# Patient Record
Sex: FEMALE | Race: WHITE | NOT HISPANIC OR LATINO
[De-identification: names, ages, dates, MRNs, and addresses within clinical notes are randomized per-mention and may not be internally consistent; named-entity substitution may affect disease eponyms.]

---

## 2017-02-17 ENCOUNTER — LAB SERVICES (OUTPATIENT)
Dept: OTHER | Age: 22
End: 2017-02-17

## 2017-02-17 LAB
T4 FREE SERPL-MCNC: 1.01 NG/DL (ref 0.78–2.19)
TSH SERPL-ACNC: <0.02 M[IU]/L (ref 0.3–4.82)

## 2017-03-14 ENCOUNTER — MYAURORA ACCOUNT LINK (OUTPATIENT)
Dept: OTHER | Age: 22
End: 2017-03-14

## 2017-03-14 ENCOUNTER — CHARTING TRANS (OUTPATIENT)
Dept: OBGYN | Age: 22
End: 2017-03-14

## 2017-03-22 ENCOUNTER — LAB SERVICES (OUTPATIENT)
Dept: OTHER | Age: 22
End: 2017-03-22

## 2017-03-22 ENCOUNTER — MYAURORA ACCOUNT LINK (OUTPATIENT)
Dept: OTHER | Age: 22
End: 2017-03-22

## 2017-03-22 ENCOUNTER — CHARTING TRANS (OUTPATIENT)
Dept: URGENT CARE | Age: 22
End: 2017-03-22

## 2017-03-22 LAB — DEPRECATED S PYO AG THROAT QL EIA: NEGATIVE

## 2017-03-22 ASSESSMENT — PAIN SCALES - GENERAL: PAINLEVEL_OUTOF10: 7

## 2017-05-05 ENCOUNTER — LAB SERVICES (OUTPATIENT)
Dept: OTHER | Age: 22
End: 2017-05-05

## 2017-05-05 LAB
T4 FREE SERPL-MCNC: 0.72 NG/DL (ref 0.78–2.19)
TSH SERPL-ACNC: <0.02 M[IU]/L (ref 0.3–4.82)

## 2017-05-07 LAB — T3 SERPL-MCNC: 1.03 NG/ML (ref 0.86–1.92)

## 2017-05-11 ENCOUNTER — CHARTING TRANS (OUTPATIENT)
Dept: OTHER | Age: 22
End: 2017-05-11

## 2017-05-19 ENCOUNTER — CHARTING TRANS (OUTPATIENT)
Dept: URGENT CARE | Age: 22
End: 2017-05-19

## 2017-05-19 ENCOUNTER — MYAURORA ACCOUNT LINK (OUTPATIENT)
Dept: OTHER | Age: 22
End: 2017-05-19

## 2017-05-19 ASSESSMENT — PAIN SCALES - GENERAL: PAINLEVEL_OUTOF10: 9

## 2017-05-22 ENCOUNTER — CHARTING TRANS (OUTPATIENT)
Dept: OTHER | Age: 22
End: 2017-05-22

## 2017-05-23 ENCOUNTER — CHARTING TRANS (OUTPATIENT)
Dept: OTHER | Age: 22
End: 2017-05-23

## 2017-05-24 ENCOUNTER — CHARTING TRANS (OUTPATIENT)
Dept: OTHER | Age: 22
End: 2017-05-24

## 2017-05-26 ENCOUNTER — CHARTING TRANS (OUTPATIENT)
Dept: INTERNAL MEDICINE | Age: 22
End: 2017-05-26

## 2017-05-26 ENCOUNTER — MYAURORA ACCOUNT LINK (OUTPATIENT)
Dept: OTHER | Age: 22
End: 2017-05-26

## 2017-05-27 ENCOUNTER — LAB SERVICES (OUTPATIENT)
Dept: OTHER | Age: 22
End: 2017-05-27

## 2017-05-27 ENCOUNTER — CHARTING TRANS (OUTPATIENT)
Dept: OTHER | Age: 22
End: 2017-05-27

## 2017-05-27 LAB
DIFFERENTIAL TYPE: ABNORMAL
HEMATOCRIT: 38.1 % (ref 34–45)
HEMOGLOBIN: 12.9 G/DL (ref 11.2–15.7)
LYMPH PERCENT: 39.8 % (ref 20.5–51.1)
LYMPHOCYTE ABSOLUTE #: 2.4 10*3/UL (ref 1.2–3.4)
MEAN CORPUSCULAR HGB CONCENTRATION: 33.9 % (ref 32–36)
MEAN CORPUSCULAR HGB: 29.3 PG (ref 27–34)
MEAN CORPUSCULAR VOLUME: 86.6 FL (ref 79–95)
MEAN PLATELET VOLUME: 10.1 FL (ref 8.6–12.4)
MIXED %: 10.7 % (ref 4.3–12.9)
MIXED ABSOLUTE #: 0.7 10*3/UL (ref 0.2–0.9)
NEUTROPHIL ABSOLUTE #: 3 10*3/UL (ref 1.4–6.5)
NEUTROPHIL PERCENT: 49.5 % (ref 34–73.5)
PLATELET COUNT: 453 10*3/UL (ref 150–400)
RED BLOOD CELL COUNT: 4.4 10*6/UL (ref 3.7–5.2)
RED CELL DISTRIBUTION WIDTH: 15 % (ref 11.3–14.8)
T4 FREE SERPL-MCNC: 0.82 NG/DL (ref 0.78–2.19)
TSH SERPL-ACNC: 0.19 M[IU]/L (ref 0.3–4.82)
WHITE BLOOD CELL COUNT: 6.1 10*3/UL (ref 4–10)

## 2017-05-28 LAB — T3 SERPL-MCNC: 1.5 NG/ML (ref 0.86–1.92)

## 2017-05-31 ENCOUNTER — CHARTING TRANS (OUTPATIENT)
Dept: ENDOCRINOLOGY | Age: 22
End: 2017-05-31

## 2017-06-01 ENCOUNTER — CHARTING TRANS (OUTPATIENT)
Dept: OTHER | Age: 22
End: 2017-06-01

## 2017-06-08 ENCOUNTER — CHARTING TRANS (OUTPATIENT)
Dept: OTHER | Age: 22
End: 2017-06-08

## 2017-06-15 ENCOUNTER — LAB SERVICES (OUTPATIENT)
Dept: OTHER | Age: 22
End: 2017-06-15

## 2017-06-15 LAB
DIFFERENTIAL TYPE: NORMAL
HEMATOCRIT: 41.5 % (ref 34–45)
HEMOGLOBIN: 14.2 G/DL (ref 11.2–15.7)
LYMPH PERCENT: 35 % (ref 20.5–51.1)
LYMPHOCYTE ABSOLUTE #: 2.5 10*3/UL (ref 1.2–3.4)
MEAN CORPUSCULAR HGB CONCENTRATION: 34.2 % (ref 32–36)
MEAN CORPUSCULAR HGB: 29.6 PG (ref 27–34)
MEAN CORPUSCULAR VOLUME: 86.5 FL (ref 79–95)
MEAN PLATELET VOLUME: 10.8 FL (ref 8.6–12.4)
MIXED %: 10.2 % (ref 4.3–12.9)
MIXED ABSOLUTE #: 0.7 10*3/UL (ref 0.2–0.9)
NEUTROPHIL ABSOLUTE #: 3.9 10*3/UL (ref 1.4–6.5)
NEUTROPHIL PERCENT: 54.8 % (ref 34–73.5)
PLATELET COUNT: 228 10*3/UL (ref 150–400)
RED BLOOD CELL COUNT: 4.8 10*6/UL (ref 3.7–5.2)
RED CELL DISTRIBUTION WIDTH: 14.1 % (ref 11.3–14.8)
T4 FREE SERPL-MCNC: 0.99 NG/DL (ref 0.78–2.19)
TSH SERPL-ACNC: <0.02 M[IU]/L (ref 0.3–4.82)
WHITE BLOOD CELL COUNT: 7.1 10*3/UL (ref 4–10)

## 2017-06-16 LAB — T3 SERPL-MCNC: 1.39 NG/ML (ref 0.86–1.92)

## 2017-07-03 ENCOUNTER — CHARTING TRANS (OUTPATIENT)
Dept: OTHER | Age: 22
End: 2017-07-03

## 2017-07-14 ENCOUNTER — LAB SERVICES (OUTPATIENT)
Dept: OTHER | Age: 22
End: 2017-07-14

## 2017-07-14 LAB
T4 FREE SERPL-MCNC: 1.41 NG/DL (ref 0.78–2.19)
TSH SERPL-ACNC: <0.02 M[IU]/L (ref 0.3–4.82)

## 2017-07-16 LAB — T3 SERPL-MCNC: 1.2 NG/ML (ref 0.86–1.92)

## 2017-07-18 ENCOUNTER — CHARTING TRANS (OUTPATIENT)
Dept: OTHER | Age: 22
End: 2017-07-18

## 2017-07-27 ENCOUNTER — CHARTING TRANS (OUTPATIENT)
Dept: ENDOCRINOLOGY | Age: 22
End: 2017-07-27

## 2017-07-27 ENCOUNTER — MYAURORA ACCOUNT LINK (OUTPATIENT)
Dept: OTHER | Age: 22
End: 2017-07-27

## 2017-08-10 ENCOUNTER — CHARTING TRANS (OUTPATIENT)
Dept: OTHER | Age: 22
End: 2017-08-10

## 2017-08-14 ENCOUNTER — LAB SERVICES (OUTPATIENT)
Dept: OTHER | Age: 22
End: 2017-08-14

## 2017-08-14 LAB
HCG UR QL: NEGATIVE
T4 FREE SERPL-MCNC: 1.2 NG/DL (ref 0.78–2.19)
TSH SERPL-ACNC: <0.02 M[IU]/L (ref 0.3–4.82)

## 2017-08-15 LAB — T3 SERPL-MCNC: 1.26 NG/ML (ref 0.86–1.92)

## 2017-08-22 ENCOUNTER — CHARTING TRANS (OUTPATIENT)
Dept: OTHER | Age: 22
End: 2017-08-22

## 2017-10-12 ENCOUNTER — LAB SERVICES (OUTPATIENT)
Dept: OTHER | Age: 22
End: 2017-10-12

## 2017-10-12 LAB
T4 FREE SERPL-MCNC: 2.24 NG/DL (ref 0.78–2.19)
TSH SERPL-ACNC: <0.02 M[IU]/L (ref 0.3–4.82)

## 2017-10-17 ENCOUNTER — CHARTING TRANS (OUTPATIENT)
Dept: ENDOCRINOLOGY | Age: 22
End: 2017-10-17

## 2017-10-17 ENCOUNTER — MYAURORA ACCOUNT LINK (OUTPATIENT)
Dept: OTHER | Age: 22
End: 2017-10-17

## 2017-12-01 ENCOUNTER — LAB SERVICES (OUTPATIENT)
Dept: OTHER | Age: 22
End: 2017-12-01

## 2017-12-01 LAB
T4 FREE SERPL-MCNC: 0.51 NG/DL (ref 0.78–2.19)
TSH SERPL-ACNC: 0.59 M[IU]/L (ref 0.3–4.82)

## 2017-12-03 LAB — T3 SERPL-MCNC: 0.71 NG/ML (ref 0.86–1.92)

## 2017-12-05 ENCOUNTER — CHARTING TRANS (OUTPATIENT)
Dept: OTHER | Age: 22
End: 2017-12-05

## 2018-01-17 ENCOUNTER — LAB SERVICES (OUTPATIENT)
Dept: OTHER | Age: 23
End: 2018-01-17

## 2018-01-17 LAB
T4 FREE SERPL-MCNC: 0.99 NG/DL (ref 0.78–2.19)
TSH SERPL-ACNC: 2.29 M[IU]/L (ref 0.3–4.82)

## 2018-03-06 ENCOUNTER — LAB SERVICES (OUTPATIENT)
Dept: OTHER | Age: 23
End: 2018-03-06

## 2018-03-06 LAB
T4 FREE SERPL-MCNC: 0.98 NG/DL (ref 0.78–2.19)
TSH SERPL-ACNC: 2.06 M[IU]/L (ref 0.3–4.82)

## 2018-03-09 ENCOUNTER — CHARTING TRANS (OUTPATIENT)
Dept: OTHER | Age: 23
End: 2018-03-09

## 2018-03-12 ENCOUNTER — CHARTING TRANS (OUTPATIENT)
Dept: OTHER | Age: 23
End: 2018-03-12

## 2018-03-15 ENCOUNTER — CHARTING TRANS (OUTPATIENT)
Dept: OTHER | Age: 23
End: 2018-03-15

## 2018-05-31 ENCOUNTER — MYAURORA ACCOUNT LINK (OUTPATIENT)
Dept: OTHER | Age: 23
End: 2018-05-31

## 2018-05-31 ENCOUNTER — CHARTING TRANS (OUTPATIENT)
Dept: OTHER | Age: 23
End: 2018-05-31

## 2018-06-01 ENCOUNTER — CHARTING TRANS (OUTPATIENT)
Dept: OTHER | Age: 23
End: 2018-06-01

## 2018-06-11 ENCOUNTER — CHARTING TRANS (OUTPATIENT)
Dept: OTHER | Age: 23
End: 2018-06-11

## 2018-07-02 ENCOUNTER — CHARTING TRANS (OUTPATIENT)
Dept: OTHER | Age: 23
End: 2018-07-02

## 2018-07-11 ENCOUNTER — CHARTING TRANS (OUTPATIENT)
Dept: OTHER | Age: 23
End: 2018-07-11

## 2018-07-12 ENCOUNTER — LAB SERVICES (OUTPATIENT)
Dept: OTHER | Age: 23
End: 2018-07-12

## 2018-07-12 LAB
RUBV IGG SERPL QL IA: NORMAL [IU]/ML
TSH SERPL DL<=0.05 MIU/L-ACNC: 1.29 M[IU]/L (ref 0.3–4.82)

## 2018-07-13 ENCOUNTER — CHARTING TRANS (OUTPATIENT)
Dept: OTHER | Age: 23
End: 2018-07-13

## 2018-07-13 LAB — HBV SURFACE AB SERPL IA-ACNC: <3.1 MUNITS/ML

## 2018-07-16 LAB
MUV IGG SER IA-ACNC: 1.4 OD RATIO
VZV IGG SER IA-ACNC: NORMAL

## 2018-07-17 LAB — MEV IGG SER IA-ACNC: ABNORMAL

## 2018-07-27 ENCOUNTER — CHARTING TRANS (OUTPATIENT)
Dept: OTHER | Age: 23
End: 2018-07-27

## 2018-08-02 ENCOUNTER — CHARTING TRANS (OUTPATIENT)
Dept: OTHER | Age: 23
End: 2018-08-02

## 2018-08-07 ENCOUNTER — CHARTING TRANS (OUTPATIENT)
Dept: OTHER | Age: 23
End: 2018-08-07

## 2018-08-07 ENCOUNTER — OFFICE VISIT (OUTPATIENT)
Dept: OCCUPATIONAL MEDICINE | Age: 23
End: 2018-08-07
Attending: FAMILY MEDICINE

## 2018-08-23 ENCOUNTER — CHARTING TRANS (OUTPATIENT)
Dept: OTHER | Age: 23
End: 2018-08-23

## 2018-09-05 ENCOUNTER — CHARTING TRANS (OUTPATIENT)
Dept: OTHER | Age: 23
End: 2018-09-05

## 2018-11-23 ENCOUNTER — IMAGING SERVICES (OUTPATIENT)
Dept: OTHER | Age: 23
End: 2018-11-23

## 2018-11-28 VITALS
HEART RATE: 60 BPM | HEIGHT: 63 IN | DIASTOLIC BLOOD PRESSURE: 60 MMHG | BODY MASS INDEX: 23.92 KG/M2 | SYSTOLIC BLOOD PRESSURE: 114 MMHG | WEIGHT: 135 LBS

## 2018-11-28 VITALS
WEIGHT: 132 LBS | BODY MASS INDEX: 23.39 KG/M2 | SYSTOLIC BLOOD PRESSURE: 114 MMHG | HEIGHT: 63 IN | DIASTOLIC BLOOD PRESSURE: 72 MMHG

## 2018-11-28 VITALS
SYSTOLIC BLOOD PRESSURE: 120 MMHG | DIASTOLIC BLOOD PRESSURE: 80 MMHG | HEIGHT: 63 IN | WEIGHT: 129 LBS | BODY MASS INDEX: 22.86 KG/M2 | HEART RATE: 72 BPM

## 2018-11-28 VITALS
WEIGHT: 130 LBS | DIASTOLIC BLOOD PRESSURE: 70 MMHG | SYSTOLIC BLOOD PRESSURE: 106 MMHG | OXYGEN SATURATION: 99 % | HEART RATE: 60 BPM | TEMPERATURE: 98 F | BODY MASS INDEX: 23.04 KG/M2 | HEIGHT: 63 IN

## 2018-11-28 VITALS
SYSTOLIC BLOOD PRESSURE: 122 MMHG | TEMPERATURE: 97.5 F | RESPIRATION RATE: 16 BRPM | DIASTOLIC BLOOD PRESSURE: 75 MMHG | OXYGEN SATURATION: 100 % | HEART RATE: 74 BPM

## 2018-11-28 VITALS
DIASTOLIC BLOOD PRESSURE: 84 MMHG | SYSTOLIC BLOOD PRESSURE: 108 MMHG | RESPIRATION RATE: 20 BRPM | HEART RATE: 124 BPM | OXYGEN SATURATION: 97 % | TEMPERATURE: 101 F

## 2018-11-29 VITALS
DIASTOLIC BLOOD PRESSURE: 72 MMHG | SYSTOLIC BLOOD PRESSURE: 114 MMHG | HEIGHT: 63 IN | BODY MASS INDEX: 23.57 KG/M2 | HEART RATE: 74 BPM | WEIGHT: 133 LBS

## 2019-01-02 ENCOUNTER — TELEPHONE (OUTPATIENT)
Dept: ENDOCRINOLOGY | Age: 24
End: 2019-01-02

## 2019-01-02 DIAGNOSIS — E05.90 HYPERTHYROIDISM: Primary | ICD-10-CM

## 2019-01-08 ENCOUNTER — E-ADVICE (OUTPATIENT)
Dept: ENDOCRINOLOGY | Age: 24
End: 2019-01-08

## 2019-02-07 RX ORDER — LEVOTHYROXINE SODIUM 0.07 MG/1
TABLET ORAL
Qty: 14 TABLET | Refills: 0 | Status: SHIPPED | OUTPATIENT
Start: 2019-02-07 | End: 2019-02-19 | Stop reason: SDUPTHER

## 2019-02-15 ENCOUNTER — LAB SERVICES (OUTPATIENT)
Dept: LAB | Age: 24
End: 2019-02-15

## 2019-02-15 DIAGNOSIS — E01.8 IODINE HYPOTHYROIDISM: Primary | ICD-10-CM

## 2019-02-15 DIAGNOSIS — E01.8 IODINE HYPOTHYROIDISM: ICD-10-CM

## 2019-02-15 LAB — TSH SERPL DL<=0.05 MIU/L-ACNC: 0.61 M[IU]/L (ref 0.3–4.82)

## 2019-02-15 PROCEDURE — 84443 ASSAY THYROID STIM HORMONE: CPT | Performed by: INTERNAL MEDICINE

## 2019-02-15 PROCEDURE — 36415 COLL VENOUS BLD VENIPUNCTURE: CPT | Performed by: INTERNAL MEDICINE

## 2019-02-18 ENCOUNTER — TELEPHONE (OUTPATIENT)
Dept: ENDOCRINOLOGY | Age: 24
End: 2019-02-18

## 2019-02-19 RX ORDER — LEVOTHYROXINE SODIUM 0.07 MG/1
75 TABLET ORAL DAILY
Qty: 90 TABLET | Refills: 0 | Status: SHIPPED | OUTPATIENT
Start: 2019-02-19 | End: 2019-05-15 | Stop reason: SDUPTHER

## 2019-03-05 VITALS
RESPIRATION RATE: 18 BRPM | SYSTOLIC BLOOD PRESSURE: 100 MMHG | DIASTOLIC BLOOD PRESSURE: 72 MMHG | HEART RATE: 52 BPM | OXYGEN SATURATION: 100 % | BODY MASS INDEX: 22.86 KG/M2 | TEMPERATURE: 96.1 F | WEIGHT: 129 LBS | HEIGHT: 63 IN

## 2019-04-13 ENCOUNTER — WALK IN (OUTPATIENT)
Dept: URGENT CARE | Age: 24
End: 2019-04-13

## 2019-04-13 DIAGNOSIS — J02.9 SORE THROAT: Primary | ICD-10-CM

## 2019-04-13 DIAGNOSIS — J01.90 ACUTE SINUSITIS, RECURRENCE NOT SPECIFIED, UNSPECIFIED LOCATION: ICD-10-CM

## 2019-04-13 LAB
INTERNAL PROCEDURAL CONTROLS ACCEPTABLE: YES
S PYO AG THROAT QL IA.RAPID: NEGATIVE

## 2019-04-13 PROCEDURE — 87880 STREP A ASSAY W/OPTIC: CPT | Performed by: FAMILY MEDICINE

## 2019-04-13 PROCEDURE — 99214 OFFICE O/P EST MOD 30 MIN: CPT | Performed by: FAMILY MEDICINE

## 2019-04-13 RX ORDER — CEFUROXIME AXETIL 500 MG/1
500 TABLET ORAL 2 TIMES DAILY
Qty: 20 TABLET | Refills: 0 | Status: SHIPPED | OUTPATIENT
Start: 2019-04-13 | End: 2019-04-23

## 2019-04-13 RX ORDER — PREDNISONE 50 MG/1
50 TABLET ORAL DAILY
Qty: 3 TABLET | Refills: 0 | Status: SHIPPED | OUTPATIENT
Start: 2019-04-13 | End: 2019-04-16

## 2019-04-13 ASSESSMENT — PAIN SCALES - GENERAL: PAINLEVEL: 5-6

## 2019-05-15 RX ORDER — LEVOTHYROXINE SODIUM 0.07 MG/1
75 TABLET ORAL DAILY
Qty: 90 TABLET | Refills: 0 | Status: SHIPPED | OUTPATIENT
Start: 2019-05-15 | End: 2019-06-05 | Stop reason: SDUPTHER

## 2019-05-21 ENCOUNTER — APPOINTMENT (OUTPATIENT)
Dept: ENDOCRINOLOGY | Age: 24
End: 2019-05-21

## 2019-06-04 ENCOUNTER — APPOINTMENT (OUTPATIENT)
Dept: ENDOCRINOLOGY | Age: 24
End: 2019-06-04

## 2019-06-04 ENCOUNTER — LAB SERVICES (OUTPATIENT)
Dept: LAB | Age: 24
End: 2019-06-04

## 2019-06-04 DIAGNOSIS — E05.90 HYPERTHYROIDISM: Primary | ICD-10-CM

## 2019-06-04 DIAGNOSIS — E05.90 HYPERTHYROIDISM: ICD-10-CM

## 2019-06-04 LAB — TSH SERPL DL<=0.05 MIU/L-ACNC: 1.84 M[IU]/L (ref 0.3–4.82)

## 2019-06-04 PROCEDURE — 84443 ASSAY THYROID STIM HORMONE: CPT | Performed by: INTERNAL MEDICINE

## 2019-06-04 PROCEDURE — 36415 COLL VENOUS BLD VENIPUNCTURE: CPT | Performed by: INTERNAL MEDICINE

## 2019-06-05 ENCOUNTER — OFFICE VISIT (OUTPATIENT)
Dept: ENDOCRINOLOGY | Age: 24
End: 2019-06-05

## 2019-06-05 VITALS
WEIGHT: 128 LBS | SYSTOLIC BLOOD PRESSURE: 102 MMHG | BODY MASS INDEX: 22.68 KG/M2 | HEIGHT: 63 IN | DIASTOLIC BLOOD PRESSURE: 60 MMHG | HEART RATE: 56 BPM

## 2019-06-05 DIAGNOSIS — E01.8 IODINE HYPOTHYROIDISM: Primary | ICD-10-CM

## 2019-06-05 DIAGNOSIS — Z86.39 HISTORY OF GRAVES' DISEASE: ICD-10-CM

## 2019-06-05 PROBLEM — D61.811: Status: RESOLVED | Noted: 2019-06-05 | Resolved: 2019-06-05

## 2019-06-05 PROBLEM — D61.811: Status: ACTIVE | Noted: 2019-06-05

## 2019-06-05 PROCEDURE — 99213 OFFICE O/P EST LOW 20 MIN: CPT | Performed by: INTERNAL MEDICINE

## 2019-06-05 RX ORDER — LEVOTHYROXINE SODIUM 0.07 MG/1
75 TABLET ORAL DAILY
Qty: 90 TABLET | Refills: 2 | Status: SHIPPED | OUTPATIENT
Start: 2019-06-05 | End: 2020-02-19 | Stop reason: SDUPTHER

## 2019-06-07 ENCOUNTER — OFFICE VISIT (OUTPATIENT)
Dept: OBGYN | Age: 24
End: 2019-06-07

## 2019-06-07 VITALS — WEIGHT: 129.5 LBS | SYSTOLIC BLOOD PRESSURE: 122 MMHG | BODY MASS INDEX: 22.94 KG/M2 | DIASTOLIC BLOOD PRESSURE: 68 MMHG

## 2019-06-07 DIAGNOSIS — Z12.4 SCREENING FOR CERVICAL CANCER: Primary | ICD-10-CM

## 2019-06-07 DIAGNOSIS — Z11.3 SCREENING FOR STDS (SEXUALLY TRANSMITTED DISEASES): ICD-10-CM

## 2019-06-07 DIAGNOSIS — Z23 NEED FOR HPV VACCINATION: ICD-10-CM

## 2019-06-07 DIAGNOSIS — Z11.51 SCREENING FOR HUMAN PAPILLOMAVIRUS (HPV): ICD-10-CM

## 2019-06-07 DIAGNOSIS — Z01.419 ENCOUNTER FOR GYNECOLOGICAL EXAMINATION WITHOUT ABNORMAL FINDING: ICD-10-CM

## 2019-06-07 PROCEDURE — 87491 CHLMYD TRACH DNA AMP PROBE: CPT | Performed by: OBSTETRICS & GYNECOLOGY

## 2019-06-07 PROCEDURE — 99385 PREV VISIT NEW AGE 18-39: CPT | Performed by: OBSTETRICS & GYNECOLOGY

## 2019-06-07 PROCEDURE — 90471 IMMUNIZATION ADMIN: CPT | Performed by: OBSTETRICS & GYNECOLOGY

## 2019-06-07 PROCEDURE — 87591 N.GONORRHOEAE DNA AMP PROB: CPT | Performed by: OBSTETRICS & GYNECOLOGY

## 2019-06-07 PROCEDURE — 90651 9VHPV VACCINE 2/3 DOSE IM: CPT

## 2019-06-07 PROCEDURE — 88142 CYTOPATH C/V THIN LAYER: CPT | Performed by: PATHOLOGY

## 2019-06-08 LAB
C TRACH DNA SPEC QL NAA+PROBE: NEGATIVE
N GONORRHOEA DNA SPEC QL NAA+PROBE: NEGATIVE

## 2019-06-13 LAB — AP REPORT: NORMAL

## 2019-08-01 ENCOUNTER — LAB SERVICES (OUTPATIENT)
Dept: LAB | Age: 24
End: 2019-08-01

## 2019-08-01 ENCOUNTER — OFFICE VISIT (OUTPATIENT)
Dept: INTERNAL MEDICINE | Age: 24
End: 2019-08-01

## 2019-08-01 VITALS
HEART RATE: 81 BPM | SYSTOLIC BLOOD PRESSURE: 100 MMHG | BODY MASS INDEX: 22.5 KG/M2 | TEMPERATURE: 99 F | HEIGHT: 63 IN | DIASTOLIC BLOOD PRESSURE: 64 MMHG | OXYGEN SATURATION: 98 % | WEIGHT: 127 LBS

## 2019-08-01 DIAGNOSIS — Z00.00 PREVENTATIVE HEALTH CARE: Primary | ICD-10-CM

## 2019-08-01 DIAGNOSIS — Z00.00 PREVENTATIVE HEALTH CARE: ICD-10-CM

## 2019-08-01 DIAGNOSIS — Z71.89 BEREAVEMENT COUNSELING: ICD-10-CM

## 2019-08-01 DIAGNOSIS — Z11.1 SCREENING-PULMONARY TB: ICD-10-CM

## 2019-08-01 DIAGNOSIS — Z23 NEED FOR HPV VACCINATION: ICD-10-CM

## 2019-08-01 PROBLEM — R50.81 FEBRILE NEUTROPENIA (CMD): Status: ACTIVE | Noted: 2017-05-23

## 2019-08-01 PROBLEM — E87.1 HYPONATREMIA: Status: ACTIVE | Noted: 2017-05-20

## 2019-08-01 PROBLEM — R79.89 ABNORMAL LFTS (LIVER FUNCTION TESTS): Status: ACTIVE | Noted: 2017-05-20

## 2019-08-01 PROBLEM — D70.9 FEBRILE NEUTROPENIA (CMD): Status: ACTIVE | Noted: 2017-05-23

## 2019-08-01 PROBLEM — D69.6 THROMBOCYTOPENIA (CMD): Status: ACTIVE | Noted: 2017-05-19

## 2019-08-01 PROBLEM — E87.6 HYPOKALEMIA: Status: ACTIVE | Noted: 2017-05-20

## 2019-08-01 PROBLEM — E05.00 GRAVES DISEASE: Status: ACTIVE | Noted: 2017-05-20

## 2019-08-01 LAB
BASOPHIL %: 0.5 % (ref 0–1.2)
BASOPHIL ABSOLUTE #: 0 10*3/UL (ref 0–0.1)
DIFFERENTIAL TYPE: ABNORMAL
EOSINOPHIL %: 0.9 % (ref 0–10)
EOSINOPHIL ABSOLUTE #: 0.1 10*3/UL (ref 0–0.5)
HEMATOCRIT: 43.7 % (ref 34–45)
HEMOGLOBIN: 14.4 G/DL (ref 11.2–15.7)
LYMPH PERCENT: 18 % (ref 20.5–51.1)
LYMPHOCYTE ABSOLUTE #: 1.3 10*3/UL (ref 1.2–3.4)
MEAN CORPUSCULAR HGB CONCENTRATION: 33 % (ref 32–36)
MEAN CORPUSCULAR HGB: 30.1 PG (ref 27–34)
MEAN CORPUSCULAR VOLUME: 91.2 FL (ref 79–95)
MEAN PLATELET VOLUME: 12.4 FL (ref 8.6–12.4)
MONOCYTE ABSOLUTE #: 0.5 10*3/UL (ref 0.2–0.9)
MONOCYTE PERCENT: 6.6 % (ref 4.3–12.9)
NEUTROPHIL ABSOLUTE #: 5.5 10*3/UL (ref 1.4–6.5)
NEUTROPHIL PERCENT: 74 % (ref 34–73.5)
PLATELET COUNT: 259 10*3/UL (ref 150–400)
RED BLOOD CELL COUNT: 4.79 10*6/UL (ref 3.7–5.2)
RED CELL DISTRIBUTION WIDTH: 12.5 % (ref 11.3–14.8)
WHITE BLOOD CELL COUNT: 7.4 10*3/UL (ref 4–10)

## 2019-08-01 PROCEDURE — 85025 COMPLETE CBC W/AUTO DIFF WBC: CPT | Performed by: INTERNAL MEDICINE

## 2019-08-01 PROCEDURE — 90471 IMMUNIZATION ADMIN: CPT

## 2019-08-01 PROCEDURE — 99385 PREV VISIT NEW AGE 18-39: CPT | Performed by: INTERNAL MEDICINE

## 2019-08-01 PROCEDURE — 90651 9VHPV VACCINE 2/3 DOSE IM: CPT

## 2019-08-01 PROCEDURE — 36415 COLL VENOUS BLD VENIPUNCTURE: CPT | Performed by: INTERNAL MEDICINE

## 2019-08-01 PROCEDURE — 86480 TB TEST CELL IMMUN MEASURE: CPT | Performed by: INTERNAL MEDICINE

## 2019-08-01 SDOH — HEALTH STABILITY: PHYSICAL HEALTH: ON AVERAGE, HOW MANY DAYS PER WEEK DO YOU ENGAGE IN MODERATE TO STRENUOUS EXERCISE (LIKE A BRISK WALK)?: 2 DAYS

## 2019-08-01 SDOH — HEALTH STABILITY: PHYSICAL HEALTH: ON AVERAGE, HOW MANY MINUTES DO YOU ENGAGE IN EXERCISE AT THIS LEVEL?: 40 MIN

## 2019-08-03 LAB
GAMMA INTERFERON BACKGROUND BLD IA-ACNC: 0.02 IU/ML
M TB IFN-G BLD-IMP: NEGATIVE
M TB IFN-G CD4+ BCKGRND COR BLD-ACNC: 0 IU/ML
M TB IFN-G CD4+CD8+ BCKGRND COR BLD-ACNC: 0 IU/ML
MITOGEN IGNF BCKGRD COR BLD-ACNC: 8.37 IU/ML

## 2019-08-09 ENCOUNTER — APPOINTMENT (OUTPATIENT)
Dept: OBGYN | Age: 24
End: 2019-08-09

## 2019-12-04 ENCOUNTER — WALK IN (OUTPATIENT)
Dept: URGENT CARE | Age: 24
End: 2019-12-04

## 2019-12-04 VITALS
DIASTOLIC BLOOD PRESSURE: 68 MMHG | SYSTOLIC BLOOD PRESSURE: 126 MMHG | TEMPERATURE: 96.4 F | RESPIRATION RATE: 16 BRPM | HEART RATE: 69 BPM | OXYGEN SATURATION: 100 %

## 2019-12-04 DIAGNOSIS — J02.9 SORE THROAT: Primary | ICD-10-CM

## 2019-12-04 DIAGNOSIS — J02.9 PHARYNGITIS, UNSPECIFIED ETIOLOGY: ICD-10-CM

## 2019-12-04 LAB
INTERNAL PROCEDURAL CONTROLS ACCEPTABLE: YES
S PYO AG THROAT QL IA.RAPID: NEGATIVE

## 2019-12-04 PROCEDURE — 99214 OFFICE O/P EST MOD 30 MIN: CPT | Performed by: FAMILY MEDICINE

## 2019-12-04 PROCEDURE — 87880 STREP A ASSAY W/OPTIC: CPT | Performed by: FAMILY MEDICINE

## 2019-12-04 RX ORDER — PREDNISONE 20 MG/1
40 TABLET ORAL DAILY
Qty: 14 TABLET | Refills: 0 | Status: SHIPPED | OUTPATIENT
Start: 2019-12-04 | End: 2019-12-11

## 2020-02-19 DIAGNOSIS — E01.8 IODINE HYPOTHYROIDISM: ICD-10-CM

## 2020-02-19 RX ORDER — LEVOTHYROXINE SODIUM 0.07 MG/1
TABLET ORAL
Qty: 30 TABLET | Refills: 0 | Status: SHIPPED | OUTPATIENT
Start: 2020-02-19 | End: 2020-03-16 | Stop reason: SDUPTHER

## 2020-03-16 DIAGNOSIS — E01.8 IODINE HYPOTHYROIDISM: ICD-10-CM

## 2020-03-16 RX ORDER — LEVOTHYROXINE SODIUM 0.07 MG/1
75 TABLET ORAL DAILY
Qty: 30 TABLET | Refills: 0 | Status: SHIPPED | OUTPATIENT
Start: 2020-03-16 | End: 2020-03-18 | Stop reason: SDUPTHER

## 2020-03-17 ENCOUNTER — LAB SERVICES (OUTPATIENT)
Dept: LAB | Age: 25
End: 2020-03-17

## 2020-03-17 DIAGNOSIS — E01.8 IODINE HYPOTHYROIDISM: ICD-10-CM

## 2020-03-17 LAB — TSH SERPL DL<=0.05 MIU/L-ACNC: 0.59 M[IU]/L (ref 0.3–4.82)

## 2020-03-17 PROCEDURE — 84443 ASSAY THYROID STIM HORMONE: CPT | Performed by: INTERNAL MEDICINE

## 2020-03-17 PROCEDURE — 36415 COLL VENOUS BLD VENIPUNCTURE: CPT | Performed by: INTERNAL MEDICINE

## 2020-03-18 DIAGNOSIS — E01.8 IODINE HYPOTHYROIDISM: ICD-10-CM

## 2020-03-18 RX ORDER — LEVOTHYROXINE SODIUM 0.07 MG/1
75 TABLET ORAL DAILY
Qty: 30 TABLET | Refills: 2 | Status: SHIPPED | OUTPATIENT
Start: 2020-03-18 | End: 2020-04-01 | Stop reason: SDUPTHER

## 2020-03-20 ENCOUNTER — TELEPHONE (OUTPATIENT)
Dept: ENDOCRINOLOGY | Age: 25
End: 2020-03-20

## 2020-03-25 ENCOUNTER — OFFICE VISIT (OUTPATIENT)
Dept: ENDOCRINOLOGY | Age: 25
End: 2020-03-25

## 2020-03-25 ENCOUNTER — TELEPHONE (OUTPATIENT)
Dept: ENDOCRINOLOGY | Age: 25
End: 2020-03-25

## 2020-03-25 DIAGNOSIS — E89.0 POSTABLATIVE HYPOTHYROIDISM: Primary | ICD-10-CM

## 2020-03-25 PROCEDURE — X1094 NO CHARGE VISIT: HCPCS | Performed by: NURSE PRACTITIONER

## 2020-03-26 ENCOUNTER — TELEPHONE (OUTPATIENT)
Dept: ENDOCRINOLOGY | Age: 25
End: 2020-03-26

## 2020-04-01 ENCOUNTER — OFFICE VISIT (OUTPATIENT)
Dept: ENDOCRINOLOGY | Age: 25
End: 2020-04-01

## 2020-04-01 DIAGNOSIS — E01.8 IODINE HYPOTHYROIDISM: ICD-10-CM

## 2020-04-01 PROCEDURE — 99213 OFFICE O/P EST LOW 20 MIN: CPT | Performed by: NURSE PRACTITIONER

## 2020-04-01 RX ORDER — LEVOTHYROXINE SODIUM 0.07 MG/1
75 TABLET ORAL DAILY
Qty: 90 TABLET | Refills: 3 | Status: SHIPPED | OUTPATIENT
Start: 2020-04-01 | End: 2020-09-24 | Stop reason: SDUPTHER

## 2020-09-24 DIAGNOSIS — E01.8 IODINE HYPOTHYROIDISM: ICD-10-CM

## 2020-09-24 RX ORDER — LEVOTHYROXINE SODIUM 0.07 MG/1
75 TABLET ORAL DAILY
Qty: 90 TABLET | Refills: 0 | Status: SHIPPED | OUTPATIENT
Start: 2020-09-24 | End: 2020-11-30

## 2020-10-19 ENCOUNTER — APPOINTMENT (OUTPATIENT)
Dept: OBGYN | Age: 25
End: 2020-10-19

## 2020-11-03 ENCOUNTER — APPOINTMENT (OUTPATIENT)
Dept: OBGYN | Age: 25
End: 2020-11-03

## 2020-11-10 ENCOUNTER — OFFICE VISIT (OUTPATIENT)
Dept: OBGYN | Age: 25
End: 2020-11-10

## 2020-11-10 VITALS
TEMPERATURE: 97.3 F | WEIGHT: 133 LBS | HEIGHT: 63 IN | RESPIRATION RATE: 17 BRPM | BODY MASS INDEX: 23.57 KG/M2 | DIASTOLIC BLOOD PRESSURE: 70 MMHG | HEART RATE: 68 BPM | SYSTOLIC BLOOD PRESSURE: 110 MMHG

## 2020-11-10 DIAGNOSIS — Z11.8 ENCOUNTER FOR SCREENING EXAMINATION FOR CHLAMYDIAL INFECTION: ICD-10-CM

## 2020-11-10 DIAGNOSIS — Z11.3 ENCOUNTER FOR SCREENING EXAMINATION FOR SEXUALLY TRANSMITTED DISEASE: ICD-10-CM

## 2020-11-10 DIAGNOSIS — Z01.419 ENCOUNTER FOR GYNECOLOGICAL EXAMINATION WITHOUT ABNORMAL FINDING: Primary | ICD-10-CM

## 2020-11-10 PROCEDURE — 87591 N.GONORRHOEAE DNA AMP PROB: CPT | Performed by: OBSTETRICS & GYNECOLOGY

## 2020-11-10 PROCEDURE — 99395 PREV VISIT EST AGE 18-39: CPT | Performed by: OBSTETRICS & GYNECOLOGY

## 2020-11-10 PROCEDURE — 87491 CHLMYD TRACH DNA AMP PROBE: CPT | Performed by: OBSTETRICS & GYNECOLOGY

## 2020-11-10 RX ORDER — MISOPROSTOL 200 UG/1
TABLET ORAL
Qty: 2 TABLET | Refills: 0 | Status: SHIPPED | OUTPATIENT
Start: 2020-11-10 | End: 2022-02-16 | Stop reason: ALTCHOICE

## 2020-11-12 ENCOUNTER — APPOINTMENT (OUTPATIENT)
Dept: OBGYN | Age: 25
End: 2020-11-12

## 2020-11-12 LAB
C TRACH RRNA SPEC QL NAA+PROBE: NEGATIVE
N GONORRHOEA RRNA SPEC QL NAA+PROBE: NEGATIVE
SPECIMEN SOURCE: NORMAL

## 2020-11-30 ENCOUNTER — LAB SERVICES (OUTPATIENT)
Dept: LAB | Age: 25
End: 2020-11-30

## 2020-11-30 DIAGNOSIS — E01.8 IODINE HYPOTHYROIDISM: ICD-10-CM

## 2020-11-30 DIAGNOSIS — Z01.419 ENCOUNTER FOR GYNECOLOGICAL EXAMINATION WITHOUT ABNORMAL FINDING: ICD-10-CM

## 2020-11-30 LAB — TSH SERPL DL<=0.05 MIU/L-ACNC: 3.74 M[IU]/L (ref 0.3–4.82)

## 2020-11-30 PROCEDURE — 36415 COLL VENOUS BLD VENIPUNCTURE: CPT | Performed by: OBSTETRICS & GYNECOLOGY

## 2020-11-30 PROCEDURE — 84443 ASSAY THYROID STIM HORMONE: CPT | Performed by: OBSTETRICS & GYNECOLOGY

## 2020-11-30 RX ORDER — LEVOTHYROXINE SODIUM 0.07 MG/1
TABLET ORAL
Qty: 90 TABLET | Refills: 0 | Status: SHIPPED | OUTPATIENT
Start: 2020-11-30 | End: 2021-02-17

## 2020-12-08 ENCOUNTER — OFFICE VISIT (OUTPATIENT)
Dept: OBGYN | Age: 25
End: 2020-12-08

## 2020-12-08 VITALS
SYSTOLIC BLOOD PRESSURE: 118 MMHG | DIASTOLIC BLOOD PRESSURE: 68 MMHG | WEIGHT: 137.13 LBS | HEART RATE: 70 BPM | BODY MASS INDEX: 24.3 KG/M2 | HEIGHT: 63 IN | TEMPERATURE: 96.9 F | RESPIRATION RATE: 18 BRPM

## 2020-12-08 DIAGNOSIS — Z30.09 FAMILY PLANNING: ICD-10-CM

## 2020-12-08 DIAGNOSIS — Z01.812 PRE-PROCEDURE LAB EXAM: Primary | ICD-10-CM

## 2020-12-08 DIAGNOSIS — Z30.430 ENCOUNTER FOR INSERTION OF INTRAUTERINE CONTRACEPTIVE DEVICE (IUD): ICD-10-CM

## 2020-12-08 LAB
B-HCG UR QL: NEGATIVE
INTERNAL PROCEDURAL CONTROLS ACCEPTABLE: YES

## 2020-12-08 PROCEDURE — 99214 OFFICE O/P EST MOD 30 MIN: CPT | Performed by: OBSTETRICS & GYNECOLOGY

## 2020-12-08 PROCEDURE — 58300 INSERT INTRAUTERINE DEVICE: CPT | Performed by: OBSTETRICS & GYNECOLOGY

## 2020-12-08 PROCEDURE — 81025 URINE PREGNANCY TEST: CPT | Performed by: OBSTETRICS & GYNECOLOGY

## 2021-02-08 ENCOUNTER — WALK IN (OUTPATIENT)
Dept: URGENT CARE | Age: 26
End: 2021-02-08

## 2021-02-08 ENCOUNTER — E-ADVICE (OUTPATIENT)
Dept: INTERNAL MEDICINE | Age: 26
End: 2021-02-08

## 2021-02-08 VITALS
OXYGEN SATURATION: 100 % | DIASTOLIC BLOOD PRESSURE: 70 MMHG | HEART RATE: 54 BPM | RESPIRATION RATE: 16 BRPM | SYSTOLIC BLOOD PRESSURE: 110 MMHG | TEMPERATURE: 96.1 F

## 2021-02-08 DIAGNOSIS — R30.0 DYSURIA: Primary | ICD-10-CM

## 2021-02-08 DIAGNOSIS — N30.01 ACUTE CYSTITIS WITH HEMATURIA: ICD-10-CM

## 2021-02-08 DIAGNOSIS — B37.31 CANDIDAL VAGINITIS: ICD-10-CM

## 2021-02-08 LAB
APPEARANCE, POC: ABNORMAL
BACTERIA: ABNORMAL
COLOR, POC: ABNORMAL
MUCOUS: ABNORMAL
RED BLOOD CELLS URINE: >100 (ref 0–3)
SQUAMOUS EPITHELIAL CELLS: ABNORMAL
WBC CLUMPS: ABNORMAL
WHITE BLOOD CELLS URINE: >100 (ref 0–5)
YEAST: ABNORMAL

## 2021-02-08 PROCEDURE — 87086 URINE CULTURE/COLONY COUNT: CPT | Performed by: EMERGENCY MEDICINE

## 2021-02-08 PROCEDURE — 87088 URINE BACTERIA CULTURE: CPT | Performed by: EMERGENCY MEDICINE

## 2021-02-08 PROCEDURE — 87186 SC STD MICRODIL/AGAR DIL: CPT | Performed by: EMERGENCY MEDICINE

## 2021-02-08 PROCEDURE — 81015 MICROSCOPIC EXAM OF URINE: CPT | Performed by: EMERGENCY MEDICINE

## 2021-02-08 PROCEDURE — 99213 OFFICE O/P EST LOW 20 MIN: CPT | Performed by: EMERGENCY MEDICINE

## 2021-02-08 PROCEDURE — 81002 URINALYSIS NONAUTO W/O SCOPE: CPT | Performed by: EMERGENCY MEDICINE

## 2021-02-08 RX ORDER — SULFAMETHOXAZOLE AND TRIMETHOPRIM 800; 160 MG/1; MG/1
1 TABLET ORAL 2 TIMES DAILY
Qty: 6 TABLET | Refills: 0 | Status: SHIPPED | OUTPATIENT
Start: 2021-02-08 | End: 2021-02-11

## 2021-02-08 RX ORDER — FLUCONAZOLE 150 MG/1
150 TABLET ORAL ONCE
Qty: 1 TABLET | Refills: 0 | Status: SHIPPED | OUTPATIENT
Start: 2021-02-08 | End: 2021-02-08

## 2021-02-10 LAB — FINAL REPORT: ABNORMAL

## 2021-02-10 RX ORDER — CIPROFLOXACIN 500 MG/1
500 TABLET, FILM COATED ORAL 2 TIMES DAILY
Qty: 10 TABLET | Refills: 0 | Status: SHIPPED | OUTPATIENT
Start: 2021-02-10 | End: 2021-02-15

## 2021-02-16 DIAGNOSIS — E01.8 IODINE HYPOTHYROIDISM: ICD-10-CM

## 2021-02-17 RX ORDER — LEVOTHYROXINE SODIUM 0.07 MG/1
TABLET ORAL
Qty: 90 TABLET | Refills: 0 | Status: SHIPPED | OUTPATIENT
Start: 2021-02-17 | End: 2021-05-03 | Stop reason: SDUPTHER

## 2021-04-21 ENCOUNTER — APPOINTMENT (OUTPATIENT)
Dept: ENDOCRINOLOGY | Age: 26
End: 2021-04-21

## 2021-05-03 ENCOUNTER — TELEPHONE (OUTPATIENT)
Dept: ENDOCRINOLOGY | Age: 26
End: 2021-05-03

## 2021-05-03 DIAGNOSIS — E01.8 IODINE HYPOTHYROIDISM: ICD-10-CM

## 2021-05-03 RX ORDER — LEVOTHYROXINE SODIUM 0.07 MG/1
75 TABLET ORAL DAILY
Qty: 30 TABLET | Refills: 0 | Status: SHIPPED | OUTPATIENT
Start: 2021-05-03 | End: 2021-05-20

## 2021-05-08 DIAGNOSIS — E01.8 IODINE HYPOTHYROIDISM: ICD-10-CM

## 2021-05-11 ENCOUNTER — LAB REQUISITION (OUTPATIENT)
Dept: LAB | Age: 26
End: 2021-05-11

## 2021-05-11 DIAGNOSIS — Z00.00 ENCOUNTER FOR GENERAL ADULT MEDICAL EXAMINATION WITHOUT ABNORMAL FINDINGS: ICD-10-CM

## 2021-05-11 PROCEDURE — PSEU9049 QUANTIFERON TB PLUS: Performed by: CLINICAL MEDICAL LABORATORY

## 2021-05-11 PROCEDURE — 86480 TB TEST CELL IMMUN MEASURE: CPT | Performed by: CLINICAL MEDICAL LABORATORY

## 2021-05-12 ENCOUNTER — APPOINTMENT (OUTPATIENT)
Dept: ENDOCRINOLOGY | Age: 26
End: 2021-05-12

## 2021-05-13 LAB
GAMMA INTERFERON BACKGROUND BLD IA-ACNC: 0.03 IU/ML
M TB IFN-G BLD-IMP: NEGATIVE
M TB IFN-G CD4+ BCKGRND COR BLD-ACNC: 0 IU/ML
M TB IFN-G CD4+CD8+ BCKGRND COR BLD-ACNC: 0 IU/ML
MITOGEN IGNF BCKGRD COR BLD-ACNC: 8.75 IU/ML

## 2021-05-19 ENCOUNTER — LAB SERVICES (OUTPATIENT)
Dept: LAB | Age: 26
End: 2021-05-19

## 2021-05-19 ENCOUNTER — OFFICE VISIT (OUTPATIENT)
Dept: ENDOCRINOLOGY | Age: 26
End: 2021-05-19

## 2021-05-19 VITALS
WEIGHT: 137 LBS | TEMPERATURE: 97.3 F | BODY MASS INDEX: 24.27 KG/M2 | HEART RATE: 50 BPM | OXYGEN SATURATION: 99 % | HEIGHT: 63 IN | DIASTOLIC BLOOD PRESSURE: 68 MMHG | SYSTOLIC BLOOD PRESSURE: 100 MMHG

## 2021-05-19 DIAGNOSIS — R53.83 OTHER FATIGUE: ICD-10-CM

## 2021-05-19 DIAGNOSIS — E89.0 POSTABLATIVE HYPOTHYROIDISM: ICD-10-CM

## 2021-05-19 DIAGNOSIS — E89.0 POSTABLATIVE HYPOTHYROIDISM: Primary | ICD-10-CM

## 2021-05-19 LAB — TSH SERPL DL<=0.05 MIU/L-ACNC: 1.2 M[IU]/L (ref 0.3–4.82)

## 2021-05-19 PROCEDURE — 36415 COLL VENOUS BLD VENIPUNCTURE: CPT | Performed by: NURSE PRACTITIONER

## 2021-05-19 PROCEDURE — 84443 ASSAY THYROID STIM HORMONE: CPT | Performed by: NURSE PRACTITIONER

## 2021-05-19 PROCEDURE — 99213 OFFICE O/P EST LOW 20 MIN: CPT | Performed by: NURSE PRACTITIONER

## 2021-05-20 DIAGNOSIS — E01.8 IODINE HYPOTHYROIDISM: ICD-10-CM

## 2021-05-20 DIAGNOSIS — Z11.3 ENCOUNTER FOR SCREENING FOR INFECTIONS WITH A PREDOMINANTLY SEXUAL MODE OF TRANSMISSION: ICD-10-CM

## 2021-05-20 DIAGNOSIS — Z11.51 ENCOUNTER FOR SCREENING FOR HUMAN PAPILLOMAVIRUS (HPV): ICD-10-CM

## 2021-05-20 DIAGNOSIS — N92.3 OVULATION BLEEDING: ICD-10-CM

## 2021-05-20 DIAGNOSIS — Z12.4 ENCOUNTER FOR SCREENING FOR MALIGNANT NEOPLASM OF CERVIX: ICD-10-CM

## 2021-05-20 DIAGNOSIS — Z23 ENCOUNTER FOR IMMUNIZATION: ICD-10-CM

## 2021-05-20 DIAGNOSIS — R87.610 ATYPICAL SQUAMOUS CELLS OF UNDETERMINED SIGNIFICANCE ON CYTOLOGIC SMEAR OF CERVIX (ASC-US): ICD-10-CM

## 2021-05-20 DIAGNOSIS — T83.83XS: ICD-10-CM

## 2021-05-20 DIAGNOSIS — Z01.419 ENCOUNTER FOR GYNECOLOGICAL EXAMINATION (GENERAL) (ROUTINE) WITHOUT ABNORMAL FINDINGS: ICD-10-CM

## 2021-05-20 RX ORDER — LEVOTHYROXINE SODIUM 0.07 MG/1
TABLET ORAL
Qty: 90 TABLET | Refills: 1 | Status: SHIPPED | OUTPATIENT
Start: 2021-05-20 | End: 2021-05-20 | Stop reason: SDUPTHER

## 2021-05-20 RX ORDER — LEVOTHYROXINE SODIUM 0.07 MG/1
75 TABLET ORAL DAILY
Qty: 90 TABLET | Refills: 1 | Status: SHIPPED | OUTPATIENT
Start: 2021-05-20 | End: 2021-06-02 | Stop reason: SDUPTHER

## 2021-05-25 VITALS
SYSTOLIC BLOOD PRESSURE: 104 MMHG | OXYGEN SATURATION: 100 % | RESPIRATION RATE: 16 BRPM | DIASTOLIC BLOOD PRESSURE: 68 MMHG | HEART RATE: 42 BPM | TEMPERATURE: 97.7 F

## 2021-06-02 DIAGNOSIS — E01.8 IODINE HYPOTHYROIDISM: ICD-10-CM

## 2021-06-02 RX ORDER — LEVOTHYROXINE SODIUM 0.07 MG/1
75 TABLET ORAL DAILY
Qty: 90 TABLET | Refills: 1 | Status: SHIPPED | OUTPATIENT
Start: 2021-06-02 | End: 2021-08-24 | Stop reason: SDUPTHER

## 2021-08-23 ENCOUNTER — EMPLOYEE HEALTH (OUTPATIENT)
Dept: OTHER | Age: 26
End: 2021-08-23

## 2021-08-23 DIAGNOSIS — E01.8 IODINE HYPOTHYROIDISM: ICD-10-CM

## 2021-08-23 DIAGNOSIS — Z20.822 SUSPECTED COVID-19 VIRUS INFECTION: Primary | ICD-10-CM

## 2021-08-23 RX ORDER — LEVOTHYROXINE SODIUM 0.07 MG/1
75 TABLET ORAL DAILY
Qty: 90 TABLET | Refills: 1 | OUTPATIENT
Start: 2021-08-23

## 2021-08-24 ENCOUNTER — EMPLOYEE HEALTH (OUTPATIENT)
Dept: OTHER | Age: 26
End: 2021-08-24

## 2021-08-24 ENCOUNTER — E-ADVICE (OUTPATIENT)
Dept: ENDOCRINOLOGY | Age: 26
End: 2021-08-24

## 2021-08-24 DIAGNOSIS — E01.8 IODINE HYPOTHYROIDISM: ICD-10-CM

## 2021-08-24 RX ORDER — LEVOTHYROXINE SODIUM 0.07 MG/1
75 TABLET ORAL DAILY
Qty: 90 TABLET | Refills: 1 | Status: SHIPPED | OUTPATIENT
Start: 2021-08-24 | End: 2022-09-26 | Stop reason: SDUPTHER

## 2022-02-15 ENCOUNTER — APPOINTMENT (OUTPATIENT)
Dept: OBGYN | Age: 27
End: 2022-02-15

## 2022-02-16 ENCOUNTER — OFFICE VISIT (OUTPATIENT)
Dept: OBGYN | Age: 27
End: 2022-02-16

## 2022-02-16 VITALS
BODY MASS INDEX: 22.77 KG/M2 | WEIGHT: 128.5 LBS | HEIGHT: 63 IN | RESPIRATION RATE: 12 BRPM | DIASTOLIC BLOOD PRESSURE: 66 MMHG | HEART RATE: 56 BPM | SYSTOLIC BLOOD PRESSURE: 100 MMHG

## 2022-02-16 DIAGNOSIS — N90.89 SKIN TAG OF VULVA: ICD-10-CM

## 2022-02-16 DIAGNOSIS — Z11.51 SCREENING FOR HPV (HUMAN PAPILLOMAVIRUS): ICD-10-CM

## 2022-02-16 DIAGNOSIS — Z23 NEED FOR HPV VACCINATION: ICD-10-CM

## 2022-02-16 DIAGNOSIS — Z11.3 SCREEN FOR STD (SEXUALLY TRANSMITTED DISEASE): ICD-10-CM

## 2022-02-16 DIAGNOSIS — Z12.4 SCREENING FOR CERVICAL CANCER: ICD-10-CM

## 2022-02-16 DIAGNOSIS — R87.610 ATYPICAL SQUAMOUS CELLS OF UNDETERMINED SIGNIFICANCE ON CYTOLOGIC SMEAR OF CERVIX (ASC-US): ICD-10-CM

## 2022-02-16 DIAGNOSIS — Z01.419 ENCOUNTER FOR GYNECOLOGICAL EXAMINATION WITHOUT ABNORMAL FINDING: Primary | ICD-10-CM

## 2022-02-16 DIAGNOSIS — R10.2 PELVIC PAIN IN FEMALE: ICD-10-CM

## 2022-02-16 PROCEDURE — 90471 IMMUNIZATION ADMIN: CPT | Performed by: OBSTETRICS & GYNECOLOGY

## 2022-02-16 PROCEDURE — 87624 HPV HI-RISK TYP POOLED RSLT: CPT | Performed by: CLINICAL MEDICAL LABORATORY

## 2022-02-16 PROCEDURE — 81514 NFCT DS BV&VAGINITIS DNA ALG: CPT | Performed by: OBSTETRICS & GYNECOLOGY

## 2022-02-16 PROCEDURE — 88142 CYTOPATH C/V THIN LAYER: CPT | Performed by: PATHOLOGY

## 2022-02-16 PROCEDURE — 90651 9VHPV VACCINE 2/3 DOSE IM: CPT | Performed by: OBSTETRICS & GYNECOLOGY

## 2022-02-16 PROCEDURE — 87624 HPV HI-RISK TYP POOLED RSLT: CPT | Performed by: OBSTETRICS & GYNECOLOGY

## 2022-02-16 PROCEDURE — 99395 PREV VISIT EST AGE 18-39: CPT | Performed by: OBSTETRICS & GYNECOLOGY

## 2022-02-16 PROCEDURE — 88141 CYTOPATH C/V INTERPRET: CPT | Performed by: PATHOLOGY

## 2022-02-16 PROCEDURE — PSEU9939 HPV, HIGH RISK: Performed by: CLINICAL MEDICAL LABORATORY

## 2022-02-16 PROCEDURE — 87801 DETECT AGNT MULT DNA AMPLI: CPT | Performed by: OBSTETRICS & GYNECOLOGY

## 2022-02-17 LAB
BV BACTERIA DNA VAG QL NAA+PROBE: NEGATIVE
C GLABRATA DNA VAG QL NAA+PROBE: NEGATIVE
C KRUSEI DNA VAG QL NAA+PROBE: NEGATIVE
C TRACH DNA GENITAL QL NAA+PROBE: NEGATIVE
CANDIDA DNA VAG QL NAA+PROBE: POSITIVE
N GONORRHOEA DNA GENITAL QL NAA+PROBE: NEGATIVE
T VAGINALIS DNA GENITAL QL NAA+PROBE: NEGATIVE
T VAGINALIS DNA GENITAL QL NAA+PROBE: NEGATIVE

## 2022-02-22 ENCOUNTER — LAB REQUISITION (OUTPATIENT)
Dept: LAB | Age: 27
End: 2022-02-22

## 2022-02-22 DIAGNOSIS — Z01.419 ENCOUNTER FOR GYNECOLOGICAL EXAMINATION (GENERAL) (ROUTINE) WITHOUT ABNORMAL FINDINGS: ICD-10-CM

## 2022-02-22 DIAGNOSIS — Z23 ENCOUNTER FOR IMMUNIZATION: ICD-10-CM

## 2022-02-22 DIAGNOSIS — Z11.3 ENCOUNTER FOR SCREENING FOR INFECTIONS WITH A PREDOMINANTLY SEXUAL MODE OF TRANSMISSION: ICD-10-CM

## 2022-02-22 DIAGNOSIS — N92.3 OVULATION BLEEDING: ICD-10-CM

## 2022-02-22 DIAGNOSIS — Z11.51 ENCOUNTER FOR SCREENING FOR HUMAN PAPILLOMAVIRUS (HPV): ICD-10-CM

## 2022-02-22 DIAGNOSIS — T83.83XS: ICD-10-CM

## 2022-02-22 DIAGNOSIS — Z12.4 ENCOUNTER FOR SCREENING FOR MALIGNANT NEOPLASM OF CERVIX: ICD-10-CM

## 2022-02-22 LAB — AP REPORT: ABNORMAL

## 2022-02-23 LAB
HPV16+18+45 E6+E7MRNA CVX NAA+PROBE: NEGATIVE
HPV16+18+45 E6+E7MRNA CVX NAA+PROBE: NEGATIVE
Lab: NORMAL
Lab: NORMAL

## 2022-02-25 RX ORDER — FLUCONAZOLE 150 MG/1
150 TABLET ORAL ONCE
Qty: 2 TABLET | Refills: 0 | Status: SHIPPED | OUTPATIENT
Start: 2022-02-25 | End: 2022-02-25

## 2022-03-02 ENCOUNTER — OFFICE VISIT (OUTPATIENT)
Dept: OBGYN | Age: 27
End: 2022-03-02

## 2022-03-02 ENCOUNTER — IMAGING SERVICES (OUTPATIENT)
Dept: ULTRASOUND IMAGING | Age: 27
End: 2022-03-02
Attending: OBSTETRICS & GYNECOLOGY

## 2022-03-02 VITALS
HEART RATE: 52 BPM | RESPIRATION RATE: 12 BRPM | WEIGHT: 128.5 LBS | DIASTOLIC BLOOD PRESSURE: 64 MMHG | HEIGHT: 63 IN | BODY MASS INDEX: 22.77 KG/M2 | SYSTOLIC BLOOD PRESSURE: 108 MMHG

## 2022-03-02 DIAGNOSIS — T83.32XA MALPOSITIONED INTRAUTERINE DEVICE (IUD), INITIAL ENCOUNTER: ICD-10-CM

## 2022-03-02 DIAGNOSIS — R10.2 PELVIC PAIN IN FEMALE: ICD-10-CM

## 2022-03-02 DIAGNOSIS — N90.89 OTHER SPECIFIED NONINFLAMMATORY DISORDERS OF VULVA AND PERINEUM: ICD-10-CM

## 2022-03-02 DIAGNOSIS — N90.89 SKIN TAG OF VULVA: Primary | ICD-10-CM

## 2022-03-02 PROCEDURE — 76830 TRANSVAGINAL US NON-OB: CPT | Performed by: RADIOLOGY

## 2022-03-02 PROCEDURE — 99213 OFFICE O/P EST LOW 20 MIN: CPT | Performed by: OBSTETRICS & GYNECOLOGY

## 2022-03-02 PROCEDURE — 88305 TISSUE EXAM BY PATHOLOGIST: CPT | Performed by: PATHOLOGY

## 2022-03-02 PROCEDURE — 56501 DESTROY VULVA LESIONS SIM: CPT | Performed by: OBSTETRICS & GYNECOLOGY

## 2022-03-02 PROCEDURE — 76856 US EXAM PELVIC COMPLETE: CPT | Performed by: RADIOLOGY

## 2022-03-03 ENCOUNTER — LAB REQUISITION (OUTPATIENT)
Dept: LAB | Age: 27
End: 2022-03-03

## 2022-03-03 ENCOUNTER — APPOINTMENT (OUTPATIENT)
Dept: OBGYN | Age: 27
End: 2022-03-03

## 2022-03-03 DIAGNOSIS — N90.89 OTHER SPECIFIED NONINFLAMMATORY DISORDERS OF VULVA AND PERINEUM: ICD-10-CM

## 2022-03-03 PROCEDURE — 88305 TISSUE EXAM BY PATHOLOGIST: CPT | Performed by: CLINICAL MEDICAL LABORATORY

## 2022-03-04 LAB — AP REPORT: NORMAL

## 2022-03-26 LAB
CASE RPRT: NORMAL
PATH REPORT.FINAL DX SPEC: NORMAL

## 2022-09-23 DIAGNOSIS — E01.8 IODINE HYPOTHYROIDISM: ICD-10-CM

## 2022-09-23 RX ORDER — LEVOTHYROXINE SODIUM 0.07 MG/1
75 TABLET ORAL DAILY
Qty: 90 TABLET | Refills: 1 | Status: CANCELLED | OUTPATIENT
Start: 2022-09-23

## 2022-09-26 ENCOUNTER — E-ADVICE (OUTPATIENT)
Dept: ENDOCRINOLOGY | Age: 27
End: 2022-09-26

## 2022-09-26 DIAGNOSIS — E01.8 IODINE HYPOTHYROIDISM: ICD-10-CM

## 2022-09-26 DIAGNOSIS — Z86.39 HISTORY OF GRAVES' DISEASE: Primary | ICD-10-CM

## 2022-09-26 RX ORDER — LEVOTHYROXINE SODIUM 0.07 MG/1
75 TABLET ORAL DAILY
Qty: 30 TABLET | Refills: 0 | Status: SHIPPED | OUTPATIENT
Start: 2022-09-26 | End: 2022-10-25

## 2022-09-30 DIAGNOSIS — E01.8 IODINE HYPOTHYROIDISM: ICD-10-CM

## 2022-09-30 RX ORDER — LEVOTHYROXINE SODIUM 0.07 MG/1
TABLET ORAL
Qty: 90 TABLET | Refills: 1 | OUTPATIENT
Start: 2022-09-30

## 2022-10-22 DIAGNOSIS — E01.8 IODINE HYPOTHYROIDISM: ICD-10-CM

## 2022-10-25 RX ORDER — LEVOTHYROXINE SODIUM 0.07 MG/1
TABLET ORAL
Qty: 30 TABLET | Refills: 0 | Status: SHIPPED | OUTPATIENT
Start: 2022-10-25 | End: 2022-11-18 | Stop reason: SDUPTHER

## 2022-11-02 ENCOUNTER — APPOINTMENT (OUTPATIENT)
Dept: ENDOCRINOLOGY | Age: 27
End: 2022-11-02

## 2022-11-18 ENCOUNTER — E-ADVICE (OUTPATIENT)
Dept: ENDOCRINOLOGY | Age: 27
End: 2022-11-18

## 2022-11-18 ENCOUNTER — LAB SERVICES (OUTPATIENT)
Dept: LAB | Age: 27
End: 2022-11-18

## 2022-11-18 DIAGNOSIS — E01.8 IODINE HYPOTHYROIDISM: ICD-10-CM

## 2022-11-18 DIAGNOSIS — Z86.39 HISTORY OF GRAVES' DISEASE: ICD-10-CM

## 2022-11-18 LAB — TSH SERPL-ACNC: 0.35 MCUNITS/ML (ref 0.35–5)

## 2022-11-18 PROCEDURE — 36415 COLL VENOUS BLD VENIPUNCTURE: CPT | Performed by: NURSE PRACTITIONER

## 2022-11-18 PROCEDURE — 84443 ASSAY THYROID STIM HORMONE: CPT | Performed by: INTERNAL MEDICINE

## 2022-11-18 RX ORDER — LEVOTHYROXINE SODIUM 0.07 MG/1
75 TABLET ORAL DAILY
Qty: 30 TABLET | Refills: 0 | Status: SHIPPED | OUTPATIENT
Start: 2022-11-18 | End: 2022-11-20 | Stop reason: SDUPTHER

## 2022-11-18 RX ORDER — LEVOTHYROXINE SODIUM 0.07 MG/1
TABLET ORAL
Qty: 30 TABLET | Refills: 0 | OUTPATIENT
Start: 2022-11-18

## 2022-11-20 DIAGNOSIS — E01.8 IODINE HYPOTHYROIDISM: Primary | ICD-10-CM

## 2022-11-20 RX ORDER — LEVOTHYROXINE SODIUM 0.07 MG/1
TABLET ORAL
Qty: 90 TABLET | Refills: 0 | Status: SHIPPED | OUTPATIENT
Start: 2022-11-20 | End: 2022-12-08 | Stop reason: SDUPTHER

## 2022-12-08 ENCOUNTER — V-VISIT (OUTPATIENT)
Dept: ENDOCRINOLOGY | Age: 27
End: 2022-12-08

## 2022-12-08 DIAGNOSIS — E01.8 IODINE HYPOTHYROIDISM: Primary | ICD-10-CM

## 2022-12-08 PROCEDURE — 99213 OFFICE O/P EST LOW 20 MIN: CPT | Performed by: NURSE PRACTITIONER

## 2022-12-08 RX ORDER — LEVOTHYROXINE SODIUM 0.07 MG/1
TABLET ORAL
Qty: 90 TABLET | Refills: 0 | Status: SHIPPED | OUTPATIENT
Start: 2022-12-08 | End: 2023-03-17

## 2023-03-15 ENCOUNTER — E-ADVICE (OUTPATIENT)
Dept: ENDOCRINOLOGY | Age: 28
End: 2023-03-15

## 2023-03-17 RX ORDER — LEVOTHYROXINE SODIUM 0.07 MG/1
TABLET ORAL
Qty: 30 TABLET | Refills: 0 | Status: SHIPPED | OUTPATIENT
Start: 2023-03-17 | End: 2023-03-22 | Stop reason: SDUPTHER

## 2023-03-22 RX ORDER — LEVOTHYROXINE SODIUM 0.07 MG/1
TABLET ORAL
Qty: 90 TABLET | OUTPATIENT
Start: 2023-03-22

## 2023-03-22 RX ORDER — LEVOTHYROXINE SODIUM 0.07 MG/1
TABLET ORAL
Qty: 30 TABLET | Refills: 0 | Status: SHIPPED | OUTPATIENT
Start: 2023-03-22 | End: 2023-04-22 | Stop reason: SDUPTHER

## 2023-04-20 ENCOUNTER — LAB SERVICES (OUTPATIENT)
Dept: LAB | Age: 28
End: 2023-04-20

## 2023-04-20 DIAGNOSIS — E01.8 IODINE HYPOTHYROIDISM: ICD-10-CM

## 2023-04-20 LAB
T4 FREE SERPL-MCNC: 1.1 NG/DL (ref 0.8–1.5)
TSH SERPL-ACNC: 2.36 MCUNITS/ML (ref 0.35–5)

## 2023-04-20 PROCEDURE — 84439 ASSAY OF FREE THYROXINE: CPT | Performed by: INTERNAL MEDICINE

## 2023-04-20 PROCEDURE — 36415 COLL VENOUS BLD VENIPUNCTURE: CPT | Performed by: NURSE PRACTITIONER

## 2023-04-20 PROCEDURE — 84443 ASSAY THYROID STIM HORMONE: CPT | Performed by: INTERNAL MEDICINE

## 2023-04-22 DIAGNOSIS — E01.8 IODINE HYPOTHYROIDISM: Primary | ICD-10-CM

## 2023-04-22 RX ORDER — LEVOTHYROXINE SODIUM 0.07 MG/1
TABLET ORAL
Qty: 90 TABLET | Refills: 0 | Status: SHIPPED | OUTPATIENT
Start: 2023-04-22 | End: 2023-06-21 | Stop reason: SDUPTHER

## 2023-06-15 RX ORDER — LEVOTHYROXINE SODIUM 0.07 MG/1
TABLET ORAL
Qty: 90 TABLET | Refills: 0 | Status: CANCELLED | OUTPATIENT
Start: 2023-06-15

## 2023-06-16 ENCOUNTER — E-ADVICE (OUTPATIENT)
Dept: ENDOCRINOLOGY | Age: 28
End: 2023-06-16

## 2023-06-21 RX ORDER — LEVOTHYROXINE SODIUM 0.07 MG/1
TABLET ORAL
Qty: 90 TABLET | OUTPATIENT
Start: 2023-06-21

## 2023-06-21 RX ORDER — LEVOTHYROXINE SODIUM 0.07 MG/1
TABLET ORAL
Qty: 30 TABLET | Refills: 0 | Status: SHIPPED | OUTPATIENT
Start: 2023-06-21 | End: 2023-08-07 | Stop reason: SDUPTHER

## 2023-06-23 RX ORDER — LEVOTHYROXINE SODIUM 0.07 MG/1
TABLET ORAL
Qty: 90 TABLET | OUTPATIENT
Start: 2023-06-23

## 2023-06-29 ENCOUNTER — APPOINTMENT (OUTPATIENT)
Dept: OBGYN | Age: 28
End: 2023-06-29

## 2023-08-07 ENCOUNTER — LAB SERVICES (OUTPATIENT)
Dept: LAB | Age: 28
End: 2023-08-07

## 2023-08-07 DIAGNOSIS — E01.8 IODINE HYPOTHYROIDISM: ICD-10-CM

## 2023-08-07 DIAGNOSIS — Z86.39 HISTORY OF GRAVES' DISEASE: Primary | ICD-10-CM

## 2023-08-07 LAB — TSH SERPL-ACNC: 2.75 MCUNITS/ML (ref 0.35–5)

## 2023-08-07 PROCEDURE — 84443 ASSAY THYROID STIM HORMONE: CPT | Performed by: INTERNAL MEDICINE

## 2023-08-07 PROCEDURE — 36415 COLL VENOUS BLD VENIPUNCTURE: CPT | Performed by: NURSE PRACTITIONER

## 2023-08-08 RX ORDER — LEVOTHYROXINE SODIUM 0.07 MG/1
TABLET ORAL
Qty: 90 TABLET | Refills: 1 | Status: SHIPPED | OUTPATIENT
Start: 2023-08-08

## 2023-08-08 RX ORDER — LEVOTHYROXINE SODIUM 0.07 MG/1
TABLET ORAL
Qty: 90 TABLET | Refills: 1 | Status: SHIPPED | OUTPATIENT
Start: 2023-08-08 | End: 2023-08-08 | Stop reason: SDUPTHER

## 2023-08-10 ENCOUNTER — E-ADVICE (OUTPATIENT)
Dept: ENDOCRINOLOGY | Age: 28
End: 2023-08-10

## 2023-08-18 ENCOUNTER — E-ADVICE (OUTPATIENT)
Dept: OTHER | Age: 28
End: 2023-08-18

## 2023-08-28 ENCOUNTER — APPOINTMENT (OUTPATIENT)
Dept: OBGYN | Age: 28
End: 2023-08-28

## 2023-08-31 ENCOUNTER — TELEPHONE (OUTPATIENT)
Dept: ULTRASOUND IMAGING | Age: 28
End: 2023-08-31

## 2023-09-19 ENCOUNTER — OFFICE VISIT (OUTPATIENT)
Dept: OBGYN | Age: 28
End: 2023-09-19

## 2023-09-19 VITALS
WEIGHT: 130 LBS | HEIGHT: 63 IN | DIASTOLIC BLOOD PRESSURE: 68 MMHG | TEMPERATURE: 98.3 F | SYSTOLIC BLOOD PRESSURE: 108 MMHG | RESPIRATION RATE: 20 BRPM | HEART RATE: 80 BPM | BODY MASS INDEX: 23.04 KG/M2

## 2023-09-19 DIAGNOSIS — Z12.4 PAP SMEAR FOR CERVICAL CANCER SCREENING: ICD-10-CM

## 2023-09-19 DIAGNOSIS — Z01.818 PREPROCEDURAL EXAMINATION: ICD-10-CM

## 2023-09-19 DIAGNOSIS — Z01.419 ENCOUNTER FOR GYNECOLOGICAL EXAMINATION (GENERAL) (ROUTINE) WITHOUT ABNORMAL FINDINGS: Primary | ICD-10-CM

## 2023-09-19 DIAGNOSIS — Z30.432 ENCOUNTER FOR IUD REMOVAL: ICD-10-CM

## 2023-09-19 LAB
B-HCG UR QL: NEGATIVE
INTERNAL PROCEDURAL CONTROLS ACCEPTABLE: YES
TEST LOT EXPIRATION DATE: NORMAL
TEST LOT NUMBER: NORMAL

## 2023-09-19 PROCEDURE — 81025 URINE PREGNANCY TEST: CPT | Performed by: NURSE PRACTITIONER

## 2023-09-19 PROCEDURE — 99395 PREV VISIT EST AGE 18-39: CPT | Performed by: NURSE PRACTITIONER

## 2023-09-19 PROCEDURE — 88142 CYTOPATH C/V THIN LAYER: CPT | Performed by: INTERNAL MEDICINE

## 2023-09-19 PROCEDURE — 58301 REMOVE INTRAUTERINE DEVICE: CPT | Performed by: NURSE PRACTITIONER

## 2023-09-19 ASSESSMENT — PATIENT HEALTH QUESTIONNAIRE - PHQ9
2. FEELING DOWN, DEPRESSED OR HOPELESS: NOT AT ALL
CLINICAL INTERPRETATION OF PHQ2 SCORE: NO FURTHER SCREENING NEEDED
1. LITTLE INTEREST OR PLEASURE IN DOING THINGS: NOT AT ALL
SUM OF ALL RESPONSES TO PHQ9 QUESTIONS 1 AND 2: 0
SUM OF ALL RESPONSES TO PHQ9 QUESTIONS 1 AND 2: 0

## 2023-09-21 ENCOUNTER — APPOINTMENT (OUTPATIENT)
Dept: OBGYN | Age: 28
End: 2023-09-21

## 2023-09-21 LAB
CASE RPRT: NORMAL
CLINICAL INFO: NORMAL
CYTOLOGY CVX/VAG STUDY: NORMAL
CYTOLOGY CVX/VAG STUDY: NORMAL
HOLD SPECIMEN: NORMAL
PAP EDUCATIONAL NOTE: NORMAL
PATH REPORT.ADDENDUM SPEC: NORMAL
SPECIMEN ADEQUACY: NORMAL

## 2023-09-21 RX ORDER — FLUCONAZOLE 150 MG/1
TABLET ORAL
Qty: 2 TABLET | Refills: 0 | Status: CANCELLED | OUTPATIENT
Start: 2023-09-21

## 2023-09-26 ENCOUNTER — E-ADVICE (OUTPATIENT)
Dept: OBGYN | Age: 28
End: 2023-09-26

## 2023-09-26 ENCOUNTER — LAB SERVICES (OUTPATIENT)
Dept: LAB | Age: 28
End: 2023-09-26

## 2023-09-26 DIAGNOSIS — M54.9 BACK PAIN, UNSPECIFIED BACK LOCATION, UNSPECIFIED BACK PAIN LATERALITY, UNSPECIFIED CHRONICITY: ICD-10-CM

## 2023-09-26 DIAGNOSIS — R31.9 HEMATURIA, UNSPECIFIED TYPE: Primary | ICD-10-CM

## 2023-09-26 DIAGNOSIS — R31.9 HEMATURIA, UNSPECIFIED TYPE: ICD-10-CM

## 2023-09-26 LAB
APPEARANCE UR: CLEAR
BACTERIA #/AREA URNS HPF: ABNORMAL /HPF
BILIRUB UR QL STRIP: NEGATIVE
COLOR UR: COLORLESS
GLUCOSE UR STRIP-MCNC: NEGATIVE MG/DL
HGB UR QL STRIP: ABNORMAL
HYALINE CASTS #/AREA URNS LPF: ABNORMAL /LPF
KETONES UR STRIP-MCNC: NEGATIVE MG/DL
LEUKOCYTE ESTERASE UR QL STRIP: ABNORMAL
MUCOUS THREADS URNS QL MICRO: PRESENT
NITRITE UR QL STRIP: NEGATIVE
PH UR STRIP: 6.5 [PH] (ref 5–7)
PROT UR STRIP-MCNC: NEGATIVE MG/DL
RBC #/AREA URNS HPF: ABNORMAL /HPF
SP GR UR STRIP: <1.005 (ref 1–1.03)
SQUAMOUS #/AREA URNS HPF: ABNORMAL /HPF
UROBILINOGEN UR STRIP-MCNC: 0.2 MG/DL
WBC #/AREA URNS HPF: ABNORMAL /HPF

## 2023-09-26 PROCEDURE — 87186 SC STD MICRODIL/AGAR DIL: CPT | Performed by: INTERNAL MEDICINE

## 2023-09-26 PROCEDURE — 87088 URINE BACTERIA CULTURE: CPT | Performed by: INTERNAL MEDICINE

## 2023-09-26 PROCEDURE — 87086 URINE CULTURE/COLONY COUNT: CPT | Performed by: INTERNAL MEDICINE

## 2023-09-26 PROCEDURE — 81001 URINALYSIS AUTO W/SCOPE: CPT | Performed by: INTERNAL MEDICINE

## 2023-09-26 RX ORDER — NITROFURANTOIN 25; 75 MG/1; MG/1
100 CAPSULE ORAL 2 TIMES DAILY
Qty: 14 CAPSULE | Refills: 0 | Status: SHIPPED | OUTPATIENT
Start: 2023-09-26 | End: 2023-10-03

## 2023-09-28 LAB — BACTERIA UR CULT: ABNORMAL

## 2024-01-10 ENCOUNTER — E-ADVICE (OUTPATIENT)
Dept: ENDOCRINOLOGY | Age: 29
End: 2024-01-10

## 2024-01-12 RX ORDER — LEVOTHYROXINE SODIUM 0.07 MG/1
TABLET ORAL
Qty: 30 TABLET | Refills: 0 | Status: SHIPPED | OUTPATIENT
Start: 2024-01-12

## 2024-01-15 RX ORDER — LEVOTHYROXINE SODIUM 0.07 MG/1
TABLET ORAL
Qty: 90 TABLET | OUTPATIENT
Start: 2024-01-15

## 2024-03-07 ENCOUNTER — LAB SERVICES (OUTPATIENT)
Dept: LAB | Age: 29
End: 2024-03-07

## 2024-03-07 DIAGNOSIS — E01.8 IODINE HYPOTHYROIDISM: ICD-10-CM

## 2024-03-07 LAB — TSH SERPL-ACNC: 1.49 MCUNITS/ML (ref 0.35–5)

## 2024-03-07 PROCEDURE — 36415 COLL VENOUS BLD VENIPUNCTURE: CPT | Performed by: NURSE PRACTITIONER

## 2024-03-07 PROCEDURE — 84443 ASSAY THYROID STIM HORMONE: CPT | Performed by: INTERNAL MEDICINE

## 2024-03-10 DIAGNOSIS — E01.8 IODINE HYPOTHYROIDISM: Primary | ICD-10-CM

## 2024-03-10 RX ORDER — LEVOTHYROXINE SODIUM 0.07 MG/1
75 TABLET ORAL DAILY
Qty: 90 TABLET | Refills: 0 | Status: SHIPPED | OUTPATIENT
Start: 2024-03-10

## 2024-03-12 ENCOUNTER — HOSPITAL ENCOUNTER (EMERGENCY)
Age: 29
Discharge: HOME | End: 2024-03-12
Attending: EMERGENCY MEDICINE

## 2024-03-12 ENCOUNTER — APPOINTMENT (OUTPATIENT)
Dept: GENERAL RADIOLOGY | Age: 29
End: 2024-03-12
Attending: EMERGENCY MEDICINE

## 2024-03-12 VITALS
OXYGEN SATURATION: 99 % | HEIGHT: 63 IN | HEART RATE: 54 BPM | TEMPERATURE: 99.7 F | BODY MASS INDEX: 21.45 KG/M2 | SYSTOLIC BLOOD PRESSURE: 110 MMHG | WEIGHT: 121.03 LBS | DIASTOLIC BLOOD PRESSURE: 74 MMHG | RESPIRATION RATE: 16 BRPM

## 2024-03-12 DIAGNOSIS — Z34.91 FIRST TRIMESTER PREGNANCY: ICD-10-CM

## 2024-03-12 DIAGNOSIS — R11.2 NAUSEA AND VOMITING, UNSPECIFIED VOMITING TYPE: Primary | ICD-10-CM

## 2024-03-12 DIAGNOSIS — R42 LIGHTHEADEDNESS: ICD-10-CM

## 2024-03-12 LAB
ALBUMIN SERPL-MCNC: 3.9 G/DL (ref 3.6–5.1)
ALBUMIN/GLOB SERPL: 1.4 {RATIO} (ref 1–2.4)
ALP SERPL-CCNC: 44 UNITS/L (ref 45–117)
ALT SERPL-CCNC: 19 UNITS/L
ANION GAP SERPL CALC-SCNC: 8 MMOL/L (ref 7–19)
AST SERPL-CCNC: 24 UNITS/L
ATRIAL RATE (BPM): 43
BASOPHILS # BLD: 0.1 K/MCL (ref 0–0.3)
BASOPHILS NFR BLD: 1 %
BILIRUB SERPL-MCNC: 1 MG/DL (ref 0.2–1)
BUN SERPL-MCNC: 12 MG/DL (ref 6–20)
BUN/CREAT SERPL: 15 (ref 7–25)
CALCIUM SERPL-MCNC: 9 MG/DL (ref 8.4–10.2)
CHLORIDE SERPL-SCNC: 110 MMOL/L (ref 97–110)
CO2 SERPL-SCNC: 23 MMOL/L (ref 21–32)
CREAT SERPL-MCNC: 0.81 MG/DL (ref 0.51–0.95)
DEPRECATED RDW RBC: 45.3 FL (ref 39–50)
EGFRCR SERPLBLD CKD-EPI 2021: >90 ML/MIN/{1.73_M2}
EOSINOPHIL # BLD: 0.1 K/MCL (ref 0–0.5)
EOSINOPHIL NFR BLD: 2 %
ERYTHROCYTE [DISTWIDTH] IN BLOOD: 13.8 % (ref 11–15)
FASTING DURATION TIME PATIENT: ABNORMAL H
GLOBULIN SER-MCNC: 2.8 G/DL (ref 2–4)
GLUCOSE SERPL-MCNC: 105 MG/DL (ref 70–99)
HCG SERPL-ACNC: ABNORMAL MUNITS/ML
HCT VFR BLD CALC: 38.7 % (ref 36–46.5)
HGB BLD-MCNC: 13 G/DL (ref 12–15.5)
IMM GRANULOCYTES # BLD AUTO: 0 K/MCL (ref 0–0.2)
IMM GRANULOCYTES # BLD: 0 %
LYMPHOCYTES # BLD: 1.3 K/MCL (ref 1–4.8)
LYMPHOCYTES NFR BLD: 21 %
MCH RBC QN AUTO: 30.2 PG (ref 26–34)
MCHC RBC AUTO-ENTMCNC: 33.6 G/DL (ref 32–36.5)
MCV RBC AUTO: 89.8 FL (ref 78–100)
MONOCYTES # BLD: 0.5 K/MCL (ref 0.3–0.9)
MONOCYTES NFR BLD: 8 %
NEUTROPHILS # BLD: 4 K/MCL (ref 1.8–7.7)
NEUTROPHILS NFR BLD: 68 %
NRBC BLD MANUAL-RTO: 0 /100 WBC
P AXIS (DEGREES): 94
PLATELET # BLD AUTO: 222 K/MCL (ref 140–450)
POTASSIUM SERPL-SCNC: 4.1 MMOL/L (ref 3.4–5.1)
PR-INTERVAL (MSEC): 116
PROT SERPL-MCNC: 6.7 G/DL (ref 6.4–8.2)
QRS-INTERVAL (MSEC): 80
QT-INTERVAL (MSEC): 472
QTC: 399
R AXIS (DEGREES): 87
RAINBOW EXTRA TUBES HOLD SPECIMEN: NORMAL
RAINBOW EXTRA TUBES HOLD SPECIMEN: NORMAL
RBC # BLD: 4.31 MIL/MCL (ref 4–5.2)
REPORT TEXT: NORMAL
SODIUM SERPL-SCNC: 137 MMOL/L (ref 135–145)
T AXIS (DEGREES): 51
VENTRICULAR RATE EKG/MIN (BPM): 43
WBC # BLD: 5.9 K/MCL (ref 4.2–11)

## 2024-03-12 PROCEDURE — 99284 EMERGENCY DEPT VISIT MOD MDM: CPT

## 2024-03-12 PROCEDURE — 10002807 HB RX 258: Performed by: EMERGENCY MEDICINE

## 2024-03-12 PROCEDURE — 96360 HYDRATION IV INFUSION INIT: CPT

## 2024-03-12 PROCEDURE — 84702 CHORIONIC GONADOTROPIN TEST: CPT | Performed by: EMERGENCY MEDICINE

## 2024-03-12 PROCEDURE — 93010 ELECTROCARDIOGRAM REPORT: CPT | Performed by: INTERNAL MEDICINE

## 2024-03-12 PROCEDURE — 93005 ELECTROCARDIOGRAM TRACING: CPT | Performed by: EMERGENCY MEDICINE

## 2024-03-12 PROCEDURE — 80053 COMPREHEN METABOLIC PANEL: CPT | Performed by: EMERGENCY MEDICINE

## 2024-03-12 PROCEDURE — 85025 COMPLETE CBC W/AUTO DIFF WBC: CPT | Performed by: EMERGENCY MEDICINE

## 2024-03-12 RX ORDER — DOXYLAMINE SUCCINATE AND PYRIDOXINE HYDROCHLORIDE, DELAYED RELEASE TABLETS 10 MG/10 MG 10; 10 MG/1; MG/1
1 TABLET, DELAYED RELEASE ORAL
Qty: 30 TABLET | Refills: 0 | Status: SHIPPED | OUTPATIENT
Start: 2024-03-12

## 2024-03-12 RX ADMIN — SODIUM CHLORIDE 1000 ML: 9 INJECTION, SOLUTION INTRAVENOUS at 08:12

## 2024-03-12 ASSESSMENT — ENCOUNTER SYMPTOMS
DIZZINESS: 0
SHORTNESS OF BREATH: 0
VOMITING: 0
DIARRHEA: 0
LIGHT-HEADEDNESS: 0
NAUSEA: 1
ABDOMINAL PAIN: 0
CHILLS: 0
FEVER: 0

## 2024-03-25 ENCOUNTER — APPOINTMENT (OUTPATIENT)
Dept: LAB | Age: 29
End: 2024-03-25

## 2024-03-30 LAB
AMB EXT CHLAMYDIA, NUCLEIC ACID AMP: NOT DETECTED
AMB EXT GONOCOCCUS, NUCLEIC ACID AMP: NOT DETECTED

## 2024-04-12 LAB
AMB EXT TREPONEMAL ANTIBODIES: NONREACTIVE
ANTIBODY SCREEN OB: NEGATIVE
HEPATITIS B SURFACE ANTIGEN OB: NEGATIVE
HIV RESULT OB: NEGATIVE

## 2024-07-21 NOTE — LETTER
[facility]  Administration of Anesthesia  Anika PINA agree to be cared for by a physician anesthesiologist alone and/or with a nurse anesthetist, who is specially trained to monitor me and give me medicine to put me to sleep or keep me comfortable during my procedure    I understand that my anesthesiologist and/or anesthetist is not an employee or agent of [facility] or [employer]. He or she works for [facility]    As the patient asking for anesthesia services, I agree to:  Allow the anesthesiologist (anesthesia doctor) to give me medicine and do additional procedures as necessary. Some examples are: Starting or using an “IV” to give me medicine, fluids or blood during my procedure, and having a breathing tube placed to help me breathe when I’m asleep (intubation). In the event that my heart stops working properly, I understand that my anesthesiologist will make every effort to sustain my life, unless otherwise directed by [facility] Do Not Resuscitate documents.  Tell my anesthesia doctor before my procedure:  If I am pregnant.  The last time that I ate or drank.  iii. All of the medicines I take (including prescriptions, herbal supplements, and pills I can buy without a prescription (including street drugs/illegal medications). Failure to inform my anesthesiologist about these medicines may increase my risk of anesthetic complications.  iv.If I am allergic to anything or have had a reaction to anesthesia before.  I understand how the anesthesia medicine will help me (benefits).  I understand that with any type of anesthesia medicine there are risks:  The most common risks are: nausea, vomiting, sore throat, muscle soreness, damage to my eyes, mouth, or teeth (from breathing tube placement).  Rare risks include: remembering what happened during my procedure, allergic reactions to medications, injury to my airway, heart, lungs, vision, nerves, or  [facility]  Vaginal Delivery Note    Patient Name: Chloe Ugalde  :  1994  MRN:  [id number]      Diagnoses:  IUP at 39w1d     Delivery     Delivery: Vaginal, Spontaneous     YOB: 2023    Time of Birth: 7:22 PM      Anesthesia: None     Delivering clinician: Meenakshi Whitney MD       Infant    Findings: Viable male  infant    Infant observations: Weight: 3834 g (8 lb 7.2 oz)   Observations/Comments:  scale 4      Apgars: 8  @ 1 minute /    9  @ 5 minutes     Placenta, Cord, and Fluid    Placenta delivered  Spontaneous   at  2024  7:28 PM     Cord: 3 vessels  present.   Cord blood obtained: Yes    Cord gases obtained:  No      Repair    Episiotomy: No   Lacerations: 2nd degree perineal         Estimated Blood Loss:  Quantitative Blood Loss:    mls.  Quantitative Blood Loss (mL): 175 mL         Delivery narrative: The patient is a 30 y.o.  at 39w1d who presented in spontaneous active labor with complete dilation. Fetal status reassuring throughout. Pushed with good maternal effort for  of viable male infant  @ 7:22 PM . Head delivered in IDALMIS position. Gentle downward traction on the fetal head resulted in spontaneous and atraumatic delivery of the left anterior shoulder followed by right posterior shoulder and body. Infant placed to maternal abdomen where cord was clamped and cut after 60 second delay. 3834 g (8 lb 7.2 oz) . 10 units pitocin were administered IM as no IV access at time of delivery. Placenta delivered spontaneously, intact with 3 vessel cord. Vagina, perineum and cervix examined. Cervix and rectum intact. 2nd degree laceration noted. Vaginal mucosa injected with 1% lidocaine. Laceration repaired in usual fashion with 3-0 vicryl suture. Sponge and instrument count correct. Mother and baby recovering good condition.       Meenakshi Whitney MD  24  22:23 EDT        muscles and in extremely rare instances death.  My doctor has explained to me other choices available to me for my care (alternatives).  Pregnant Patients (“epidural”):  I understand that the risks of having an epidural (medicine given into my back to help control pain during labor), include itching, low blood pressure, difficulty urinating, headache or slowing of the baby’s heart. Very rare risks include infection, bleeding, seizure, irregular heart rhythms and nerve injury.  Regional Anesthesia (“spinal”, “epidural”, & “nerve blocks”):  I understand that rare but potential complications include headache, bleeding, infection, seizure, irregular heart rhythms, and nerve injury.    _____________________________________________________________________________  Patient (or Representative) Signature/Relationship to Patient  Date   Time    _____________________________________________________________________________   Name (if used)    Language/Organization   Time    _____________________________________________________________________________  Nurse Anesthetist Signature     Date   Time  _____________________________________________________________________________  Anesthesiologist Signature     Date   Time  I have discussed the procedure and information above with the patient (or patient’s representative) and answered their questions. The patient or their representative has agreed to have anesthesia services.    _____________________________________________________________________________  Witness        Date   Time  I have verified that the signature is that of the patient or patient’s representative, and that it was signed before the procedure  Patient Name: Anika Rosas     : 12/3/1995                 Printed: 10/12/2024 at 7:41 PM    Medical Record #: [id number]                                            Page 1 of 1  ----------ANESTHESIA CONSENT----------

## 2024-08-13 LAB
AMB EXT TREPONEMAL ANTIBODIES: NONREACTIVE
HIV RESULT OB: NEGATIVE

## 2024-09-20 LAB — STREP GP B CULT OB: NEGATIVE

## 2024-09-28 ENCOUNTER — TELEPHONE (OUTPATIENT)
Dept: OBGYN UNIT | Facility: HOSPITAL | Age: 29
End: 2024-09-28

## 2024-09-28 ENCOUNTER — APPOINTMENT (OUTPATIENT)
Dept: OBGYN CLINIC | Facility: HOSPITAL | Age: 29
End: 2024-09-28
Payer: COMMERCIAL

## 2024-09-28 ENCOUNTER — HOSPITAL ENCOUNTER (OUTPATIENT)
Facility: HOSPITAL | Age: 29
Discharge: HOME | End: 2024-09-28
Attending: OBSTETRICS & GYNECOLOGY | Admitting: OBSTETRICS & GYNECOLOGY
Payer: COMMERCIAL

## 2024-09-28 PROCEDURE — 96372 THER/PROPH/DIAG INJ SC/IM: CPT

## 2024-09-28 PROCEDURE — 90471 IMMUNIZATION ADMIN: CPT

## 2024-09-28 RX ORDER — ONDANSETRON 4 MG/1
4 TABLET, ORALLY DISINTEGRATING ORAL EVERY 8 HOURS PRN
COMMUNITY

## 2024-09-28 RX ORDER — ASPIRIN 81 MG/1
81 TABLET ORAL DAILY
COMMUNITY

## 2024-09-28 RX ORDER — LEVOTHYROXINE SODIUM 100 UG/1
100 TABLET ORAL
COMMUNITY

## 2024-09-28 RX ORDER — PROMETHAZINE HYDROCHLORIDE 25 MG/1
25 TABLET ORAL EVERY 6 HOURS PRN
COMMUNITY

## 2024-09-28 RX ORDER — CHOLECALCIFEROL (VITAMIN D3) 25 MCG
1 TABLET,CHEWABLE ORAL DAILY
COMMUNITY

## 2024-09-28 RX ORDER — BETAMETHASONE SODIUM PHOSPHATE AND BETAMETHASONE ACETATE 3; 3 MG/ML; MG/ML
INJECTION, SUSPENSION INTRA-ARTICULAR; INTRALESIONAL; INTRAMUSCULAR; SOFT TISSUE
Status: COMPLETED
Start: 2024-09-28 | End: 2024-09-28

## 2024-09-28 RX ORDER — BETAMETHASONE SODIUM PHOSPHATE AND BETAMETHASONE ACETATE 3; 3 MG/ML; MG/ML
12 INJECTION, SUSPENSION INTRA-ARTICULAR; INTRALESIONAL; INTRAMUSCULAR; SOFT TISSUE EVERY 24 HOURS
Status: DISCONTINUED | OUTPATIENT
Start: 2024-09-28 | End: 2024-09-28

## 2024-09-28 NOTE — PROGRESS NOTES
Pt is a 28 year old female admitted to TR1/TR1-A.     Chief Complaint   Patient presents with    Betamethasone Injection      Pt is  35w0d intra-uterine pregnancy.  History obtained, consents signed. Oriented to room, staff, and plan of care.

## 2024-10-10 ENCOUNTER — TELEPHONE (OUTPATIENT)
Dept: OBGYN UNIT | Facility: HOSPITAL | Age: 29
End: 2024-10-10

## 2024-10-12 ENCOUNTER — HOSPITAL ENCOUNTER (INPATIENT)
Facility: HOSPITAL | Age: 29
LOS: 3 days | Discharge: HOME | End: 2024-10-15
Attending: OBSTETRICS & GYNECOLOGY | Admitting: OBSTETRICS & GYNECOLOGY
Payer: COMMERCIAL

## 2024-10-12 ENCOUNTER — APPOINTMENT (OUTPATIENT)
Dept: OBGYN CLINIC | Facility: HOSPITAL | Age: 29
End: 2024-10-12
Attending: OBSTETRICS & GYNECOLOGY
Payer: COMMERCIAL

## 2024-10-12 PROBLEM — Z34.90 PREGNANCY (HCC): Status: ACTIVE | Noted: 2024-10-12

## 2024-10-12 LAB
ANTIBODY SCREEN: NEGATIVE
BASOPHILS # BLD AUTO: 0.07 X10(3) UL (ref 0–0.2)
BASOPHILS NFR BLD AUTO: 0.8 %
DEPRECATED RDW RBC AUTO: 40.7 FL (ref 35.1–46.3)
EOSINOPHIL # BLD AUTO: 0.21 X10(3) UL (ref 0–0.7)
EOSINOPHIL NFR BLD AUTO: 2.3 %
ERYTHROCYTE [DISTWIDTH] IN BLOOD BY AUTOMATED COUNT: 14 % (ref 11–15)
HCT VFR BLD AUTO: 34.6 %
HGB BLD-MCNC: 11.7 G/DL
IMM GRANULOCYTES # BLD AUTO: 0.06 X10(3) UL (ref 0–1)
IMM GRANULOCYTES NFR BLD: 0.7 %
LYMPHOCYTES # BLD AUTO: 2.41 X10(3) UL (ref 1–4)
LYMPHOCYTES NFR BLD AUTO: 26.6 %
MCH RBC QN AUTO: 27.5 PG (ref 26–34)
MCHC RBC AUTO-ENTMCNC: 33.8 G/DL (ref 31–37)
MCV RBC AUTO: 81.2 FL
MONOCYTES # BLD AUTO: 0.79 X10(3) UL (ref 0.1–1)
MONOCYTES NFR BLD AUTO: 8.7 %
NEUTROPHILS # BLD AUTO: 5.52 X10 (3) UL (ref 1.5–7.7)
NEUTROPHILS # BLD AUTO: 5.52 X10(3) UL (ref 1.5–7.7)
NEUTROPHILS NFR BLD AUTO: 60.9 %
PLATELET # BLD AUTO: 199 10(3)UL (ref 150–450)
RBC # BLD AUTO: 4.26 X10(6)UL
RH BLOOD TYPE: POSITIVE
RH BLOOD TYPE: POSITIVE
T PALLIDUM AB SER QL IA: NONREACTIVE
WBC # BLD AUTO: 9.1 X10(3) UL (ref 4–11)

## 2024-10-12 PROCEDURE — 86900 BLOOD TYPING SEROLOGIC ABO: CPT | Performed by: OBSTETRICS & GYNECOLOGY

## 2024-10-12 PROCEDURE — 85025 COMPLETE CBC W/AUTO DIFF WBC: CPT | Performed by: OBSTETRICS & GYNECOLOGY

## 2024-10-12 PROCEDURE — 86850 RBC ANTIBODY SCREEN: CPT | Performed by: OBSTETRICS & GYNECOLOGY

## 2024-10-12 PROCEDURE — 86780 TREPONEMA PALLIDUM: CPT | Performed by: OBSTETRICS & GYNECOLOGY

## 2024-10-12 PROCEDURE — 86901 BLOOD TYPING SEROLOGIC RH(D): CPT | Performed by: OBSTETRICS & GYNECOLOGY

## 2024-10-12 PROCEDURE — 3E033VJ INTRODUCTION OF OTHER HORMONE INTO PERIPHERAL VEIN, PERCUTANEOUS APPROACH: ICD-10-PCS | Performed by: OBSTETRICS & GYNECOLOGY

## 2024-10-12 PROCEDURE — 59200 INSERT CERVICAL DILATOR: CPT

## 2024-10-12 RX ORDER — CITRIC ACID/SODIUM CITRATE 334-500MG
30 SOLUTION, ORAL ORAL AS NEEDED
Status: DISCONTINUED | OUTPATIENT
Start: 2024-10-12 | End: 2024-10-13 | Stop reason: HOSPADM

## 2024-10-12 RX ORDER — HYDROXYZINE HYDROCHLORIDE 50 MG/ML
50 INJECTION, SOLUTION INTRAMUSCULAR EVERY 6 HOURS PRN
Status: DISCONTINUED | OUTPATIENT
Start: 2024-10-12 | End: 2024-10-13 | Stop reason: HOSPADM

## 2024-10-12 RX ORDER — ACETAMINOPHEN 500 MG
500 TABLET ORAL EVERY 6 HOURS PRN
Status: DISCONTINUED | OUTPATIENT
Start: 2024-10-12 | End: 2024-10-13 | Stop reason: HOSPADM

## 2024-10-12 RX ORDER — ACETAMINOPHEN 500 MG
1000 TABLET ORAL EVERY 6 HOURS PRN
Status: DISCONTINUED | OUTPATIENT
Start: 2024-10-12 | End: 2024-10-13 | Stop reason: HOSPADM

## 2024-10-12 RX ORDER — BUPIVACAINE HYDROCHLORIDE 2.5 MG/ML
20 INJECTION, SOLUTION EPIDURAL; INFILTRATION; INTRACAUDAL ONCE
Status: COMPLETED | OUTPATIENT
Start: 2024-10-12 | End: 2024-10-12

## 2024-10-12 RX ORDER — SODIUM CHLORIDE, SODIUM LACTATE, POTASSIUM CHLORIDE, CALCIUM CHLORIDE 600; 310; 30; 20 MG/100ML; MG/100ML; MG/100ML; MG/100ML
INJECTION, SOLUTION INTRAVENOUS AS NEEDED
Status: DISCONTINUED | OUTPATIENT
Start: 2024-10-12 | End: 2024-10-13 | Stop reason: HOSPADM

## 2024-10-12 RX ORDER — TERBUTALINE SULFATE 1 MG/ML
0.25 INJECTION, SOLUTION SUBCUTANEOUS AS NEEDED
Status: DISCONTINUED | OUTPATIENT
Start: 2024-10-12 | End: 2024-10-13 | Stop reason: HOSPADM

## 2024-10-12 RX ORDER — BUPIVACAINE HCL/0.9 % NACL/PF 0.25 %
5 PLASTIC BAG, INJECTION (ML) EPIDURAL AS NEEDED
Status: DISCONTINUED | OUTPATIENT
Start: 2024-10-12 | End: 2024-10-14

## 2024-10-12 RX ORDER — ONDANSETRON 2 MG/ML
4 INJECTION INTRAMUSCULAR; INTRAVENOUS EVERY 6 HOURS PRN
Status: DISCONTINUED | OUTPATIENT
Start: 2024-10-12 | End: 2024-10-13 | Stop reason: HOSPADM

## 2024-10-12 RX ORDER — IBUPROFEN 600 MG/1
600 TABLET, FILM COATED ORAL ONCE AS NEEDED
Status: DISCONTINUED | OUTPATIENT
Start: 2024-10-12 | End: 2024-10-13 | Stop reason: HOSPADM

## 2024-10-12 RX ORDER — LIDOCAINE HYDROCHLORIDE 10 MG/ML
30 INJECTION, SOLUTION EPIDURAL; INFILTRATION; INTRACAUDAL; PERINEURAL ONCE
Status: COMPLETED | OUTPATIENT
Start: 2024-10-12 | End: 2024-10-13

## 2024-10-12 RX ORDER — NALBUPHINE HYDROCHLORIDE 10 MG/ML
2.5 INJECTION INTRAMUSCULAR; INTRAVENOUS; SUBCUTANEOUS
Status: DISCONTINUED | OUTPATIENT
Start: 2024-10-12 | End: 2024-10-14

## 2024-10-12 RX ORDER — NALBUPHINE HYDROCHLORIDE 10 MG/ML
10 INJECTION INTRAMUSCULAR; INTRAVENOUS; SUBCUTANEOUS EVERY 6 HOURS PRN
Status: DISCONTINUED | OUTPATIENT
Start: 2024-10-12 | End: 2024-10-13 | Stop reason: HOSPADM

## 2024-10-12 RX ORDER — DEXTROSE, SODIUM CHLORIDE, SODIUM LACTATE, POTASSIUM CHLORIDE, AND CALCIUM CHLORIDE 5; .6; .31; .03; .02 G/100ML; G/100ML; G/100ML; G/100ML; G/100ML
INJECTION, SOLUTION INTRAVENOUS CONTINUOUS
Status: DISCONTINUED | OUTPATIENT
Start: 2024-10-12 | End: 2024-10-13 | Stop reason: HOSPADM

## 2024-10-12 NOTE — H&P
[facility]  part of [facility]    History & Physical    Anika Rosas Patient Status:  Inpatient    12/3/1995 MRN [id number]   Location [facility] Attending Rufina Marlow MD   Hosp Day # 0 PCP No primary care provider on file.     Date of Admission:  10/12/2024      HPI:   Anika Rosas is a 28 year old  female, current EGA of 37w0d with an estimated date of delivery of: 2024, by Last Menstrual Period who presents due to  IOL ..    Being admitted for induction of labor.      Pt denies N/V/F/C/CP/SOB, HA, blurry vision, dizziness, RUQ pain, ctx, lof, VB.    Her current obstetrical history is significant for poor fetal growth (IUGR)  Patient Active Problem List   Diagnosis    Pregnancy (HCC)         Fetal Movement reported as good.  GBS negative.   Rh positive.    History   Obstetric History:   OB History    Para Term  AB Living   1             SAB IAB Ectopic Multiple Live Births                  # Outcome Date GA Lbr Derick/2nd Weight Sex Type Anes PTL Lv   1 Current                Gyne History:   Last pap smear: 2023  Negative cytology    Past Medical History:   Past Medical History:    Hypothyroidism       Past Surgerical History: Hypothyroid History reviewed. No pertinent surgical history.    Social History:   Social History     Tobacco Use    Smoking status: Never    Smokeless tobacco: Never   Substance Use Topics    Alcohol use: Not Currently        Allergies/Medications:   Allergies:   Allergies[1]    Medications:  Prescriptions Prior to Admission[2]      Review of Systems:   As documented in HPI      Physical Exam:   Temp:  [97.3 °F (36.3 °C)-98.5 °F (36.9 °C)] 98.5 °F (36.9 °C)  Pulse:  [59] 59  Resp:  [16] 16  BP: (116-119)/(76-80) 119/80    Constitutional: alert and cooperative in No distress    Abdomen: soft,  nontender, gravid    Vaginal exam: Dilation: 1 cm    Effacement: thick    Station: -2    Consistency:  medium    Position: mid    FHT assessment:   Baseline: 120 bpm   Variability: moderate   Accels:  present   Decels: No   Tocos:  irregular   Category: 1 tracing    Neurologic: Alert and oriented  Psychiatric: Cooperative    Results:     MFM US at 35 5/7wks on 10/3/2024:  IUP cephalic presentation  Placenta posterior; 3VC;  EFW: 2126 g (4lbs 11oz) at 11%  JAISON: 6.6 cm  BPP 8/8    Recent Results (from the past 24 hours)   CBC With Differential With Platelet    Collection Time: 10/12/24  5:53 AM   Result Value Ref Range    WBC 9.1 4.0 - 11.0 x10(3) uL    RBC 4.26 3.80 - 5.30 x10(6)uL    HGB 11.7 (L) 12.0 - 16.0 g/dL    HCT 34.6 (L) 35.0 - 48.0 %    MCV 81.2 80.0 - 100.0 fL    MCH 27.5 26.0 - 34.0 pg    MCHC 33.8 31.0 - 37.0 g/dL    RDW-SD 40.7 35.1 - 46.3 fL    RDW 14.0 11.0 - 15.0 %    .0 150.0 - 450.0 10(3)uL    Neutrophil Absolute Prelim 5.52 1.50 - 7.70 x10 (3) uL    Neutrophil Absolute 5.52 1.50 - 7.70 x10(3) uL    Lymphocyte Absolute 2.41 1.00 - 4.00 x10(3) uL    Monocyte Absolute 0.79 0.10 - 1.00 x10(3) uL    Eosinophil Absolute 0.21 0.00 - 0.70 x10(3) uL    Basophil Absolute 0.07 0.00 - 0.20 x10(3) uL    Immature Granulocyte Absolute 0.06 0.00 - 1.00 x10(3) uL    Neutrophil % 60.9 %    Lymphocyte % 26.6 %    Monocyte % 8.7 %    Eosinophil % 2.3 %    Basophil % 0.8 %    Immature Granulocyte % 0.7 %   T Pallidum Screening [facility]ascade    Collection Time: 10/12/24  5:53 AM   Result Value Ref Range    Treponemal Antibodies Nonreactive Nonreactive    ABORH (Blood Type)    Collection Time: 10/12/24  5:53 AM   Result Value Ref Range    ABO BLOOD TYPE A     RH BLOOD TYPE Positive    Antibody Screen    Collection Time: 10/12/24  5:53 AM   Result Value Ref Range    Antibody Screen Negative        No results found.        Assessment/Plan:   IUP 37w0d  in / with IOL    Obstetrical history significant for poor fetal growth (IUGR).   Patient Active Problem List   Diagnosis    Pregnancy (HCC)         Treatment  Plan:  IOL    Anika Rosas is a 28 year old  female, current EGA of 37w0d who presents for admission due to IOL    Risks, benefits, alternatives and possible complications have been discussed in detail with the patient.   Pre-admission, admission, and post admission procedures and expectations were discussed in detail.    All questions answered; all appropriate consents will be signed at the Hospital.    IUP at 37w0d  Fetal heart tones category 1  IOL: admit, routine labs, Cook's balloon for cervical ripening followed by Pitocin, epidural if patient desires  GBS negative  Poor fetal growth (IUGR)  CPM      Alecia Reynolds MD  10/12/2024  7:47 AM   IOL       [1]   Allergies  Allergen Reactions    Shellfish-Derived Products ANAPHYLAXIS    Methimazole [Thiamazole] OTHER (SEE COMMENTS)     Caused pancytopenia   [2]   Medications Prior to Admission   Medication Sig Dispense Refill Last Dose/Taking    levothyroxine 100 MCG Oral Tab Take 1 tablet (100 mcg total) by mouth before breakfast.   10/12/2024 Morning    prenatal vitamin with DHA 27-0.8-228 MG Oral Cap Take 1 capsule by mouth daily.   10/11/2024    aspirin 81 MG Oral Tab EC Take 1 tablet (81 mg total) by mouth daily.   10/11/2024    ondansetron 4 MG Oral Tablet Dispersible Take 1 tablet (4 mg total) by mouth every 8 (eight) hours as needed for Nausea.   More than a month    promethazine 25 MG Oral Tab Take 1 tablet (25 mg total) by mouth every 6 (six) hours as needed for Nausea.   More than a month

## 2024-10-12 NOTE — PROGRESS NOTES
Pt is a 28 year old female admitted to LDR3/LDR3-A.     Chief Complaint   Patient presents with    Scheduled Induction     IUGR      Pt is  37w0d intra-uterine pregnancy.  History obtained, consents signed. Oriented to room, staff, and plan of care.

## 2024-10-12 NOTE — PROGRESS NOTES
Limited OB Ultrasound ([id number])    FACILITY: [facility]  EXAMINATION DATE: 10/12/2024     Indication: position check  EGA: 37w0d     Findings  Number of gestations: cunningham  Presentation:  cephalic  Fetal Cardiac Activity: present, 135 bpm  Placenta: posterior  JAISON: subjectively normal  Movement: Gross and fine movement visualized    Impression  Cunningham IUP in the cephalic position    Performed and Read By: CARMELLA Reynolds MD     Cooks Balloon Placement:  Discussed risks, benefits, and alternatives of induction of labor.  Discussed methods used to induce pt into labor.  Discussed induction of labor using Cook's balloon and how it works. Pt verbalized understanding.  Pt was placed in lithotomy position.  The bed was placed up and the leg portion down and pt was placed at the edge of the separation.  Speculum was placed in the introitus.  The cervix was visualized.  The Cooks catheter was placed per protocol.  It slid in smoothly without resistance.  The uterine balloon was filled with 80ml and the vaginal balloon was filled with 80 ml of normal saline.  Pt tolerated the procedure well.  No complication were noted.

## 2024-10-13 ENCOUNTER — ANESTHESIA (OUTPATIENT)
Dept: OBGYN UNIT | Facility: HOSPITAL | Age: 29
End: 2024-10-13
Payer: COMMERCIAL

## 2024-10-13 ENCOUNTER — ANESTHESIA EVENT (OUTPATIENT)
Dept: OBGYN UNIT | Facility: HOSPITAL | Age: 29
End: 2024-10-13
Payer: COMMERCIAL

## 2024-10-13 PROCEDURE — 88307 TISSUE EXAM BY PATHOLOGIST: CPT | Performed by: OBSTETRICS & GYNECOLOGY

## 2024-10-13 PROCEDURE — 0KQM0ZZ REPAIR PERINEUM MUSCLE, OPEN APPROACH: ICD-10-PCS | Performed by: OBSTETRICS & GYNECOLOGY

## 2024-10-13 RX ORDER — LIDOCAINE HYDROCHLORIDE AND EPINEPHRINE 15; 5 MG/ML; UG/ML
INJECTION, SOLUTION EPIDURAL
Status: COMPLETED | OUTPATIENT
Start: 2024-10-13 | End: 2024-10-13

## 2024-10-13 RX ORDER — AMMONIA INHALANTS 0.04 G/.3ML
0.3 INHALANT RESPIRATORY (INHALATION) AS NEEDED
Status: DISCONTINUED | OUTPATIENT
Start: 2024-10-13 | End: 2024-10-15

## 2024-10-13 RX ORDER — ACETAMINOPHEN 500 MG
1000 TABLET ORAL EVERY 6 HOURS PRN
Status: DISCONTINUED | OUTPATIENT
Start: 2024-10-13 | End: 2024-10-15

## 2024-10-13 RX ORDER — DOCUSATE SODIUM 100 MG/1
100 CAPSULE, LIQUID FILLED ORAL
Status: DISCONTINUED | OUTPATIENT
Start: 2024-10-13 | End: 2024-10-15

## 2024-10-13 RX ORDER — IBUPROFEN 600 MG/1
600 TABLET, FILM COATED ORAL EVERY 6 HOURS
Status: DISCONTINUED | OUTPATIENT
Start: 2024-10-13 | End: 2024-10-15

## 2024-10-13 RX ORDER — BISACODYL 10 MG
10 SUPPOSITORY, RECTAL RECTAL ONCE AS NEEDED
Status: DISCONTINUED | OUTPATIENT
Start: 2024-10-13 | End: 2024-10-15

## 2024-10-13 RX ORDER — SIMETHICONE 80 MG
80 TABLET,CHEWABLE ORAL 3 TIMES DAILY PRN
Status: DISCONTINUED | OUTPATIENT
Start: 2024-10-13 | End: 2024-10-15

## 2024-10-13 RX ORDER — MISOPROSTOL 200 UG/1
TABLET ORAL
Status: COMPLETED
Start: 2024-10-13 | End: 2024-10-13

## 2024-10-13 RX ORDER — LIDOCAINE HYDROCHLORIDE 10 MG/ML
INJECTION, SOLUTION INFILTRATION; PERINEURAL
Status: COMPLETED | OUTPATIENT
Start: 2024-10-13 | End: 2024-10-13

## 2024-10-13 RX ORDER — ACETAMINOPHEN 500 MG
500 TABLET ORAL EVERY 6 HOURS PRN
Status: DISCONTINUED | OUTPATIENT
Start: 2024-10-13 | End: 2024-10-15

## 2024-10-13 RX ADMIN — LIDOCAINE HYDROCHLORIDE AND EPINEPHRINE 5 ML: 15; 5 INJECTION, SOLUTION EPIDURAL at 03:21:00

## 2024-10-13 RX ADMIN — LIDOCAINE HYDROCHLORIDE 5 ML: 10 INJECTION, SOLUTION INFILTRATION; PERINEURAL at 03:21:00

## 2024-10-13 NOTE — ANESTHESIA PREPROCEDURE EVALUATION
Anesthesia PreOp Note    HPI:     Anika Rosas is a 28 year old female who presents for preoperative consultation requested by: * No surgeons listed *    Date of Surgery: 10/13/2024    * No procedures listed *  Indication: * No pre-op diagnosis entered *    Relevant Problems   No relevant active problems       NPO:                         History Review:  Patient Active Problem List    Diagnosis Date Noted    Pregnancy (HCC) 10/12/2024       Past Medical History:    Hypothyroidism       History reviewed. No pertinent surgical history.    Prescriptions Prior to Admission[1]  Current Medications and Prescriptions Ordered in Epic[2]    Allergies[3]    History reviewed. No pertinent family history.  Social History     Socioeconomic History    Marital status:    Tobacco Use    Smoking status: Never    Smokeless tobacco: Never   Vaping Use    Vaping status: Never Used   Substance and Sexual Activity    Alcohol use: Not Currently    Drug use: Never       Available pre-op labs reviewed.  Lab Results   Component Value Date    WBC 9.1 10/12/2024    RBC 4.26 10/12/2024    HGB 11.7 (L) 10/12/2024    HCT 34.6 (L) 10/12/2024    MCV 81.2 10/12/2024    MCH 27.5 10/12/2024    MCHC 33.8 10/12/2024    RDW 14.0 10/12/2024    .0 10/12/2024             Vital Signs:  Body mass index is 25.69 kg/m².   height is 1.6 m (5' 3\") and weight is 65.8 kg (145 lb). Her oral temperature is 97.9 °F (36.6 °C). Her blood pressure is 120/69 and her pulse is 50. Her respiration is 16 and oxygen saturation is 100%.   Vitals:    10/12/24 2115 10/12/24 2140 10/12/24 2300 10/13/24 0024   BP: 121/70 131/78 115/66 120/69   Pulse: 60 54 51 50   Resp:   16    Temp:   97.9 °F (36.6 °C)    TempSrc:   Oral    SpO2:       Weight:       Height:            Anesthesia Evaluation     Patient summary reviewed and Nursing notes reviewed    Airway   Mallampati: II  TM distance: >3 FB  Neck ROM: full  Dental      Pulmonary - negative ROS and normal exam    Cardiovascular - normal exam  Exercise tolerance: good    NYHA Classification: I    Neuro/Psych - negative ROS     GI/Hepatic/Renal - negative ROS     Endo/Other    (+) hypothyroidism  Abdominal  - normal exam                 Anesthesia Plan:   ASA:  2  Plan:   Epidural  Informed Consent Plan and Risks Discussed With:  Patient  Discussed plan with:  Surgeon and attending      I have informed Anika Byker and/or legal guardian or family member of the nature of the anesthetic plan, benefits, risks including possible dental damage if relevant, major complications, and any alternative forms of anesthetic management.   All of the patient's questions were answered to the best of my ability. The patient desires the anesthetic management as planned.  I have informed this Anika Byker [relative] of the risks of neuraxial anesthesia including, but not limited to: failure,headache, backache, spinal, unilateral/patchy block, difficulty breathing, infection, bleeding, nerve damage, paralysis, death. The patient desires the proposed neuraxial anesthetic as planned.    SARA GALEAS MD  10/13/2024 3:20 AM  Present on Admission:  **None**           [1]   Medications Prior to Admission   Medication Sig Dispense Refill Last Dose/Taking    levothyroxine 100 MCG Oral Tab Take 1 tablet (100 mcg total) by mouth before breakfast.   10/12/2024 Morning    prenatal vitamin with DHA 27-0.8-228 MG Oral Cap Take 1 capsule by mouth daily.   10/11/2024    aspirin 81 MG Oral Tab EC Take 1 tablet (81 mg total) by mouth daily.   10/11/2024    ondansetron 4 MG Oral Tablet Dispersible Take 1 tablet (4 mg total) by mouth every 8 (eight) hours as needed for Nausea.   More than a month    promethazine 25 MG Oral Tab Take 1 tablet (25 mg total) by mouth every 6 (six) hours as needed for Nausea.   More than a month   [2]   Current Facility-Administered Medications Ordered in Epic   Medication Dose Route Frequency Provider Last Rate Last Admin    dextrose  in lactated ringers 5% infusion   Intravenous Continuous Alecia Reynolds MD        lactated ringers infusion   Intravenous PRN Alecia Reynolds  mL/hr at 10/13/24 0000 New Bag at 10/13/24 0000    lactated ringers IV bolus 500 mL  500 mL Intravenous PRN Alecia Reynolds MD        acetaminophen (Tylenol Extra Strength) tab 500 mg  500 mg Oral Q6H PRN Alecia Reynolds MD        acetaminophen (Tylenol Extra Strength) tab 1,000 mg  1,000 mg Oral Q6H PRN Alecia Reynolds MD        ibuprofen (Motrin) tab 600 mg  600 mg Oral Once PRN Alecia Reynolds MD        ondansetron (Zofran) 4 MG/2ML injection 4 mg  4 mg Intravenous Q6H PRN Alecia Reynolds MD        oxyTOCIN in sodium chloride 0.9% (Pitocin) 30 Units/500mL infusion premix  62.5-900 jon-units/min Intravenous Continuous Alecia Reynolds MD        terbutaline (Brethine) 1 MG/ML injection 0.25 mg  0.25 mg Subcutaneous PRN Alecia Reynolds MD        sodium citrate-citric acid (Bicitra) 500-334 MG/5ML oral solution 30 mL  30 mL Oral PRN Alecia Reynolds MD        lidocaine PF (Xylocaine-MPF) 1% injection  30 mL Intradermal Once Alecia Reynolds MD        nalbuphine (Nubain) 10 mg/mL injection 10 mg  10 mg Intramuscular Q6H PRN Alecia Reynolds MD   10 mg at 10/13/24 0013    And    hydrOXYzine 50 mg/mL injection 50 mg  50 mg Intramuscular Q6H PRN Alecia Reynolds MD   50 mg at 10/13/24 0013    fentaNYL (Sublimaze) 50 mcg/mL injection 100 mcg  100 mcg Intravenous Once Alecia Reynolds MD        fentaNYL (Sublimaze) 50 mcg/mL injection 50 mcg  50 mcg Intravenous Q30 Min PRN Alecia Reynolds MD        oxyTOCIN in sodium chloride 0.9% (Pitocin) 30 Units/500mL infusion premix  0.5-20 jon-units/min Intravenous Continuous Alecia Reynolds MD 10 mL/hr at 10/13/24 0248 10 jon-units/min at 10/13/24 0248    lactated ringers IV bolus 1,000 mL  1,000 mL Intravenous Once Alecia Reynolds MD 2,000 mL/hr at 10/13/24 0240 Rate Change at  10/13/24 0240    fentaNYL-bupivacaine 2 mcg/mL-0.125% in sodium chloride 0.9% 100 mL EPIDURAL infusion premix   Epidural Continuous Alecia Reynolds MD        fentaNYL (Sublimaze) 50 mcg/mL injection 100 mcg  100 mcg Epidural Once Alecia Reynolds MD        bupivacaine PF (Marcaine) 0.25% injection  20 mL Epidural Once Alecia Reynolds MD        EPHEDrine (PF) 25 MG/5 ML injection 5 mg  5 mg Intravenous PRN Alecia Reynolds MD        nalbuphine (Nubain) 10 mg/mL injection 2.5 mg  2.5 mg Intravenous Q15 Min PRN Alecia Reynolds MD         No current Epic-ordered outpatient medications on file.   [3]   Allergies  Allergen Reactions    Shellfish-Derived Products ANAPHYLAXIS    Methimazole [Thiamazole] OTHER (SEE COMMENTS)     Caused pancytopenia

## 2024-10-13 NOTE — LACTATION NOTE
LACTATION NOTE - MOTHER      Evaluation Type: Inpatient    Problems identified  Problems identified: Knowledge deficit    Maternal history  Maternal history: Induction of labor  Other/comment: IUGR    Breastfeeding goal  Breastfeeding goal: To maintain breast milk feeding per patient goal    Maternal Assessment  Bilateral Breasts: Filling;Symmetrical  Bilateral Nipples: WNL  Prior breastfeeding experience (comment below): Primip  Breastfeeding Assistance: Hand expression provided with permission    Pain assessment  Location/Comment: denies  Treatment of Sore Nipples: Lanolin    Guidelines for use of:  Suggested use of pump: Pump if infant is not latching to breast              Attempted to breastfeed. Infant sleepy. Encouraged STS. Patient return demonstrated hand expression and spoon feeding. Discussed normal NB behavior. Encouraged to call LC if assistance with breastfeeding is needed.

## 2024-10-13 NOTE — LACTATION NOTE
LACTATION NOTE - MOTHER      Evaluation Type: Inpatient    Problems identified  Problems identified: Knowledge deficit    Maternal history  Maternal history: Induction of labor  Other/comment: IUGR    Breastfeeding goal  Breastfeeding goal: To maintain breast milk feeding per patient goal    Maternal Assessment  Bilateral Breasts: Filling;Symmetrical  Bilateral Nipples: WNL  Prior breastfeeding experience (comment below): Primip  Breastfeeding Assistance: Hand expression provided with permission;Pumping assistance provided with permission    Pain assessment  Location/Comment: denies  Treatment of Sore Nipples: Lanolin    Guidelines for use of:  Suggested use of pump: Pump if infant is not latching to breast;Pump each time a supplement is offered              Assisted with a feeding per mom's request. Baby blood sugar was stable, baby was sleepy and had no feeding cues. Mom did not want to breast fed for this feeding, assisted with a hand pump session, and mom got 14 ml total from both sides. We syringe fed to baby, and she was very spitty, not waking, and gagging and not wanting to suck, she could have spit up 50% of the feeding. Parents were going to supplement with formula after LC left. Discussed taking it feeding by feeding, and can try to breast fed if blood sugar is stable or if wants to pump and fed. Discussed pumping each time baby has formula or in place of a feeding. Mom does have a breast pump at home. Sizing looked a little big, so given a 22.5 flange to pump with for next time.

## 2024-10-13 NOTE — PROGRESS NOTES
Patient up to bathroom with assist x 2. Unable to void at this time. Patient transferred to mother/baby room 360 per wheelchair in stable condition with baby and personal belongings.  Accompanied by significant other and staff.  Report given to Mariama mother/baby RN.

## 2024-10-13 NOTE — DISCHARGE INSTRUCTIONS
-Pelvic rest for 6 weeks; no sex, tampons, douching, baths, or pools until after 6 weeks check-up.  -No heavy lifting; increase activity gradually.  -No driving if taking narcotics.    CALL YOUR PROVIDER IF:  Increased/heavy bleeding (I.e. Changing a saturated pad every hour).  New onset chills/fever greater than 100.4.  Pain in your vagina or abdomen that gets worse and isn't relieved with medicine.  Swelling or discharge with a bad odor from vagina.  Burning, pain, red streaks, or lumpy areas in your breasts that may be accompanied by flu-like symptoms.  Painful urination, or inability to control urination.  Nausea, vomiting, dizziness, or fainting.  Feelings of extreme sadness or anxiety, or a feeling that you don’t want to be with your baby.  Redness, warmth, or pain in the lower leg.  Chest pain or shortness of breath.  If : Check incision site AT LEAST 2x daily for signs and symptoms of infection (increased redness, warmth, tenderness, or drainage)    Mom & Baby Hour: Meets in person every WEDNESDAY at 10am at the [facility] in [place] ([address]) in the community education room. The group is for new moms and their babies up to 6 months of age. It includes breastfeeding supports with a certified lactation educator to be available to answer your breastfeeding questions.       Last ibuprofen 600 mg at 6 am can have anytime after 12 pm    [facility] has great support for our families even after discharge.  We have virtual or in-person support groups.  Visit our website for the most up-to-date info for our many different support groups. [email or web address]       Outpatient Lactation appointments:  Call [phone]- to schedule an appt.  Our office is located in the Maternal Fetal Medicine office next to [facility] on the first floor.        Support Groups: Moms-to-be are also welcome! All mom's welcome even if its not your first.      MOM & BABY HOUR- Every new mom can use some support in the exciting but challenging adjustment to motherhood. Join us for Mom and Baby Hour where an experienced nurse and lactation consultant will guide conversations and answer questions and provide breastfeeding support.  Most weeks we will also have a guest speaker to present information on many different parenting topics. We welcome mothers and their infants (up to crawling), dad, grandmas, and others to join our group.    Meets most  10:00 - 11:30 a.m.  Masks are not required, but be considerate of others and do not attend if mom or baby have had any symptoms of illness within the previous 24 hours. Location [facility] [place] [address] [place] Go inside the front door and to the right to the “Community Education Room”.      Nurturing Mom- A support group for new and expectant moms looking for support with the transition to parenthood as well as those experiencing symptoms of  anxiety and/or depression.  Please contact [email or web address] if you need directions or the link for the virtual meetings. Please contact [email or web address] if you plan to attend, but please be considerate of others and do not attend if mom or baby have had any symptoms of illness within the previous 24 hours.       Mom's Line: [phone]  A phone line dedicated for women (or anyone worried about a women) who may be experiencing signs or symptoms of postpartum depression, anxiety, or overwhelmed with new baby. Call - answered by live trained mental health professionals, free and confidential, emotional support, referrals, in any language.    La Leche League for breast feeding and parent support, Website: IIIi.org  and for the [place] group and other groups visit [email or web address]/Elihsa/events/    Facebook groups-  for more support when home- [facility]ies & Mommies [facility] --- you can find  mom-to-mom advice and the list of speaker topics for cradle talk program.  Telephone Support  Postpartum depression [place] (www.ppdil.org)  -Free information and support for pregnant and postpartum women with symptoms of depression, anxiety  -Mom-to-mom support from volunteers who have been through depression    Telephone support: [phone]  Urgent support:(766)-795-1920 (answered )  Email support: support@ppdil.org    Postpartum Support International (www.postpartum.net)  -Free phone conversation with trained volunteers   (173) 902-6015; after the prompt enter 91484#    National Postpartum Depression Hotline  (844)-PPD-MOMS    Helpful websites:    www.llli.[employer]  www.[employer]  www.Breastfeed[place].org    Initiation of breast pumping after discharge:     - Add 1 pumping session a day, additional to the infant's feedings, 2-3 weeks after delivery.     - Pump both breasts for 15 mins, immediately after a breast feeding session.    - Pumping first thing in the morning will provide greater output.    - If you chose to pump more than once a day, you should be consistent every day to prevent a breast infection.      - Pump using an electric pump over a hands free pump (electric pumps provided stronger stimulation to the nipples).    - Store the breast milk in 2-3 oz containers.     - Label containers with date and time.    - Always feed oldest milk first.                  Counseling     [facility] [place]   Locations: [facility]   [phone]     [facility]  Counseling [facility]   [address]   [place], [place] [address] [phone]     [facility]   [id number] [place] [id number]   [phone]     [facility]   Locations: [facility] [place]   [phone]     [facility] [place]   Vivienne Santillan [facility]   [address]   [place] [address]

## 2024-10-13 NOTE — L&D DELIVERY NOTE
Alison, Girl [[id number]]      Labor Events     labor?: No   steroids?: Full Course  Rupture date/time: 10/13/2024 0220     Rupture type: SROM  Fluid color: Clear  Labor type: Induced Onset of Labor  Induction: Oxytocin, Cervical Ripening Balloon  Indications for induction: Poor Fetal Growth       Labor Event Times    Labor onset date/time: 10/13/2024 0220  Dilation complete date/time: 10/13/2024 0530  Start pushing date/time: 10/13/2024 0552        Presentation    Presentation: Vertex  Position: Occiput Anterior       Operative Delivery    Operative Vaginal Delivery: No                Shoulder Dystocia    Shoulder Dystocia: No       Anesthesia    Method: Epidural               Delivery      Head delivery date/time: 10/13/2024 06:33:33   Delivery date/time:  10/13/24 06:33:43   Delivery type: Normal spontaneous vaginal delivery    Details:     Delivery location: delivery room  Delivery Room Temperature: 72       Delivery Providers    Delivering Clinician: Alecia Reynolds MD   Delivery personnel:  Provider Role   Janeen Mcnamara, RN Baby Nurse   Dejah Becker RN Delivery Nurse   Mimi Kumar Surgical Tech             Cord    Vessels: 3 Vessels  Complications: Nuchal  # of loops: 1  Timed cord clamping: Yes  Time in sec: 120  Cord blood disposition: to lab  Gases sent?: Yes       Resuscitation    Method: None       [place] Measurements    No data filed       Placenta    Date/time: 10/13/2024 0639  Removal: Spontaneous  Appearance: Intact  Disposition: Pathology       Apgars    Living status: Living   Apgar Scoring Key:    0 1 2    Skin color Blue or pale Acrocyanotic Completely pink    Heart rate Absent <100 bpm >100 bpm    Reflex irritability No response Grimace Cry or active withdrawal    Muscle tone Limp Some flexion Active motion    Respiratory effort Absent Weak cry; hypoventilation Good, crying              1 Minute:  5 Minute:  10 Minute:  15 Minute:  20 Minute:       Skin color: 0  1       Heart rate: 2  2       Reflex irritablity: 2  2       Muscle tone: 2  2       Respiratory effort: 2  2       Total: 8  9          Apgars assigned by: CARMELLA BRUNO RN  [place] disposition: with mother       Skin to Skin    Skin to skin initiated date/time: 10/13/2024 0633  Skin to skin with: Mother       Vaginal Count    Initial count RN: Dejah Becker RN  Initial count Tech: Stacy, Mimi   Sponges   Sharps    Initial counts 10   0    Final counts 20   2    Final count RN: Dejah Becker RN  Final count MD: Alecia Reynolds MD       Lacerations    Episiotomy: None  Perineal lacerations: None      Labial laceration: left      Vaginal laceration?: Yes Repaired?: Yes     Cervical laceration?: No    Clitoral laceration?: No    Quantitative blood loss (mL): 154              [facility]  [facility] of [facility]    Vaginal Delivery Note    Anika Byker Patient Status:  Inpatient    12/3/1995 MRN [id number]   Location [facility] Attending Alecia Reynolsd MD   Hosp Day # 1 PCP No primary care provider on file.     Delivery     Surgeon: Alecia Reynolds MD    Maternal Anesthesia: epidural     Infant  Date of Delivery: 10/13/2024   Time of Delivery: 6:33 AM  Delivery Type: Normal spontaneous vaginal delivery    Infant Sex  Information for the patient's :  Alison Girl [[id number]]   female    Infant Birthweight  Information for the patient's :  Alison Girl [[id number]]   No birth weight on file.     Presentation Vertex [1]  Position   Occiput [1] Anterior [1]    Apgars:  1 minute: 8               5 minutes: 9                        10 minutes:      Placenta:  Date/Time of Delivery: 10/13/2024  6:39 AM   Delivery: spontaneous  Placenta to Pathology: yes    Umbilical Cord:  Cord Gases Submitted: yes  Cord Blood/Tissue Collection: no  Cord Complications: single nuchal  Sponge and Needle Counts:  Verified      Episiotomy/Laceration  Repair  Left vaginal wall laceration: superficial repaired with 2-0 Vicryl after administration of 1% lidocaine.  Good repair and approximation  Left labia separation laterally (externally): repaired with 3-0 Vicryl after administratio nof 1% lidocaine.  Good repair and approximation.    Delivery Complications  none    Neonatologist Present: no    Delivery Narrative: Patient pushed and delivered a live female in OA position,over intact perineum.  Upon delivery of the fetal head, the neck was checked for a nuchal cord. Nuchal cord x1 noted and loosened at the perineum.  If needed, the nose and mouth were bulb suctioned.  Infant was delivered in total.     Delayed cord clamping was performed (2 min)  Umbilical cord was doubly clamped & cut.   Infant handed to awaiting mother with nurses at bedside.   Lacerations and repair as noted above  Cervix was inspected and no cervical lacerations or trailing membranes noted.  No uterine inversion noted.  No periuretheral / sulcus lacerations.   Placenta was delivered spontaneously intact & normal in appearance with 3 vessel cord.   Cytotec 1000mcg was placed per rectum    QBL 154ml    Alecia Reynolds MD   10/13/2024  6:57 AM

## 2024-10-13 NOTE — PLAN OF CARE
Problem: Patient Centered Care  Goal: Patient preferences are identified and integrated in the patient's plan of care  Description: Interventions:  - What would you like us to know as we care for you? Having a baby girl!  - Provide timely, complete, and accurate information to patient/family  - Incorporate patient and family knowledge, values, beliefs, and cultural backgrounds into the planning and delivery of care  - Encourage patient/family to participate in care and decision-making at the level they choose  - Honor patient and family perspectives and choices  Outcome: Progressing     Problem: Patient/Family Goals  Goal: Patient/Family Long Term Goal  Description: Patient's Long Term Goal: Uncomplicated Delivery     Interventions:  - Assessment/Monitoring  - Induction/Augmentation per protocol and Provider order  - C/S per protocol and Provider order   - Education  - Intervention per protocol and Provider order with education   - Involve patient in POC  - See additional Care Plan goals for specific interventions      Outcome: Progressing  Goal: Patient/Family Short Term Goal  Description: Patient's Short Term Goal: Comfort and Pain Control     Interventions:   - Non Pharmacological pain intervention   - IV/IM and Epidural pain medication per Provider order and patient request  - Education  - Involve Patient in POC   - See additional Care Plan goals for specific interventions        Outcome: Progressing

## 2024-10-13 NOTE — ANESTHESIA PROCEDURE NOTES
Labor Analgesia    Date/Time: 10/13/2024 3:21 AM    Performed by: Aracelis Renteria MD  Authorized by: Aracelis Renteria MD      General Information and Staff    Start Time:  10/13/2024 3:21 AM  End Time:  10/13/2024 3:37 AM  Anesthesiologist:  Aracelis Renteria MD  Performed by:  Anesthesiologist  Patient Location:  OB  Site Identification: surface landmarks    Reason for Block: labor epidural    Preanesthetic Checklist: patient identified, IV checked, site marked, risks and benefits discussed, monitors and equipment checked, pre-op evaluation, timeout performed, IV bolus, anesthesia consent and sterile technique used      Procedure Details    Patient Position:  Sitting  Prep: ChloraPrep and patient draped    Monitoring:  Heart rate, cardiac monitor and continuous pulse ox  Approach:  Midline    Epidural Needle    Injection Technique:  LALA saline  Needle Type:  Tuohy  Needle Gauge:  18 G  Needle Length:  3.5 in  Needle Insertion Depth:  5  Location:  L2-3    Spinal Needle      Catheter    Catheter Type:  Multi-orifice  Catheter Size:  20 G  Catheter at Skin Depth:  11  Test Dose:  Negative    Assessment    Sensory Level:  T8    Additional Comments

## 2024-10-14 LAB
BASOPHILS # BLD AUTO: 0.08 X10(3) UL (ref 0–0.2)
BASOPHILS NFR BLD AUTO: 0.7 %
DEPRECATED RDW RBC AUTO: 43.8 FL (ref 35.1–46.3)
EOSINOPHIL # BLD AUTO: 0.18 X10(3) UL (ref 0–0.7)
EOSINOPHIL NFR BLD AUTO: 1.6 %
ERYTHROCYTE [DISTWIDTH] IN BLOOD BY AUTOMATED COUNT: 14.6 % (ref 11–15)
HCT VFR BLD AUTO: 31.8 %
HGB BLD-MCNC: 10.1 G/DL
IMM GRANULOCYTES # BLD AUTO: 0.07 X10(3) UL (ref 0–1)
IMM GRANULOCYTES NFR BLD: 0.6 %
LYMPHOCYTES # BLD AUTO: 2.32 X10(3) UL (ref 1–4)
LYMPHOCYTES NFR BLD AUTO: 21.1 %
MCH RBC QN AUTO: 26.6 PG (ref 26–34)
MCHC RBC AUTO-ENTMCNC: 31.8 G/DL (ref 31–37)
MCV RBC AUTO: 83.7 FL
MONOCYTES # BLD AUTO: 0.85 X10(3) UL (ref 0.1–1)
MONOCYTES NFR BLD AUTO: 7.7 %
NEUTROPHILS # BLD AUTO: 7.5 X10 (3) UL (ref 1.5–7.7)
NEUTROPHILS # BLD AUTO: 7.5 X10(3) UL (ref 1.5–7.7)
NEUTROPHILS NFR BLD AUTO: 68.3 %
PLATELET # BLD AUTO: 163 10(3)UL (ref 150–450)
RBC # BLD AUTO: 3.8 X10(6)UL
WBC # BLD AUTO: 11 X10(3) UL (ref 4–11)

## 2024-10-14 PROCEDURE — 85025 COMPLETE CBC W/AUTO DIFF WBC: CPT | Performed by: OBSTETRICS & GYNECOLOGY

## 2024-10-14 RX ORDER — LEVOTHYROXINE SODIUM 50 UG/1
100 TABLET ORAL
Status: DISCONTINUED | OUTPATIENT
Start: 2024-10-14 | End: 2024-10-15

## 2024-10-14 NOTE — LACTATION NOTE
LACTATION NOTE - MOTHER      Evaluation Type: Inpatient    Problems identified  Problems identified: Knowledge deficit;Milk supply not WNL  Milk supply not WNL: Reduced (potential)  Problems Identified Other: supplementing with formula, baby is SGA & IUGR    Maternal history  Maternal history: Induction of labor  Other/comment: IUGR    Breastfeeding goal  Breastfeeding goal: To maintain breast milk feeding per patient goal    Maternal Assessment  Bilateral Breasts: Filling;Symmetrical  Bilateral Nipples: WNL  Prior breastfeeding experience (comment below): Primip  Breastfeeding Assistance: Breastfeeding assistance provided with permission;Hand expression provided with permission;Pumping assistance provided with permission    Pain assessment  Location/Comment: denies  Treatment of Sore Nipples: Lanolin    Guidelines for use of:  Breast pump type: Ameda Platinum;Hand Pump  Suggested use of pump: Pump each time a supplement is offered;Pump if infant is not latching to breast;Pump after nursing if a nipple shield is used                Attempted to breastfeed. Infant sleepy. Encouraged STS. Patient return demonstrated hand expression and spoon feeding. Discussed normal NB behavior. Encouraged to call LC if assistance with breastfeeding is needed. Patient has been doing some supplementing with formula, and baby had lower blood sugars initially.  Pt was set up with breast pumps yesterday. Patient states tried to fed before LC came in, and then baby fussy and then not interested when brought to breast, tried with the size 16 nipple shield. Then mom helped with a pumping session with the 22.5 inserts & got 20 ml, and discussed bottle feeding this and to call when ready to try to latch again.

## 2024-10-14 NOTE — PAYOR COMM NOTE
ADMISSION REVIEW     Payor: [employer]  Subscriber #:  [id number]  Authorization Number: [id number]    Admit date: 10/12/24  Admit time:  5:12 AM       REVIEW DOCUMENTATION:  ED Provider Notes    No notes of this type exist for this encounter.         MEDICATIONS ADMINISTERED IN LAST 1 DAY:  docusate sodium (Colace) cap 100 mg       Date Action Dose Route User    10/14/2024 0628 Given 100 mg Oral Beth Park RN    10/13/2024 1822 Given 100 mg Oral Mariama Wilcox RN          ibuprofen (Motrin) tab 600 mg       Date Action Dose Route User    10/14/2024 0628 Given 600 mg Oral Beth Park RN    10/14/2024 0020 Given 600 mg Oral Beth Park RN    10/13/2024 1550 Given 600 mg Oral Mariama Wilcox RN          levothyroxine (Synthroid) tab 100 mcg       Date Action Dose Route User    10/14/2024 0627 Given 100 mcg Oral Beth Park RN            Vitals (last day)       Date/Time Temp Pulse Resp BP SpO2 Weight O2 Device O2 Flow Rate (L/min) [facility]    10/14/24 0900 98 °F (36.7 °C) -- 12 -- -- -- None (Room air) -- MA    10/13/24 2100 98.1 °F (36.7 °C) 49 16 109/69 -- -- None (Room air) -- CR    10/13/24 1600 -- 59 -- 124/78 -- -- -- --     10/13/24 1550 -- 54 -- 143/72 -- -- -- --     10/13/24 1118 98.1 °F (36.7 °C) 57 16 124/69 -- -- None (Room air) --     10/13/24 0930 99.3 °F (37.4 °C) 55 16 119/71 -- -- None (Room air) --     10/13/24 0900 -- 98 -- 111/96 -- -- -- -- RP    10/13/24 0845 -- 68 -- 107/52 -- -- -- -- RP    10/13/24 0830 -- 60 -- 119/71 -- -- -- -- RP    10/13/24 0815 -- 68 -- 124/65 -- -- -- --     10/13/24 0800 -- 73 -- 141/59 -- -- -- --     10/13/24 0745 -- 69 -- 128/73 -- -- -- --     10/13/24 0730 -- 63 -- 127/70 -- -- -- --     10/13/24 0725 -- -- -- 134/72 -- -- -- --     10/13/24 0725 98 °F (36.7 °C) -- 18 -- -- -- None (Room air) --     10/13/24 0705 -- 82 -- 122/84 -- -- -- --     10/13/24 0700 -- 89 -- 138/95 100 % -- -- --     10/13/24 0600  -- 94 -- 121/74 100 % -- -- --     10/13/24 0545 97.7 °F (36.5 °C) -- -- -- -- -- -- --     10/13/24 0530 -- 90 -- 123/67 100 % -- -- --     10/13/24 0515 -- 70 -- 102/49 100 % -- -- --     10/13/24 0500 98.3 °F (36.8 °C) 76 16 106/58 100 % -- None (Room air) --     10/13/24 0445 -- 69 -- 110/52 100 % -- -- --     10/13/24 0430 -- 61 -- 111/60 100 % -- -- --     10/13/24 0417 -- 71 -- 12169 -- -- -- --     10/13/24 0401 -- 83 -- 130/68 100 % -- -- --     10/13/24 035 -- 86 -- 117/63 -- -- -- --     10/13/24 0347 -- 79 -- 104/57 -- -- -- --     10/13/24 0343 -- 68 -- 107/60 -- -- -- -- KH    10/13/24 0342 -- 64 -- 98/52 -- -- -- -- KH    10/13/24 033 -- 59 -- 89/47 -- -- -- -- KH    10/13/24 033 -- 55 -- 117/65 -- -- -- --     10/13/24 033 -- 51 -- 117/66 -- -- -- --     10/13/24 0333 -- 68 -- 122/77 -- -- -- --     10/13/24 0301 -- 62 -- 126/71 -- -- -- --     10/13/24 0230 98 °F (36.7 °C) -- -- -- -- -- -- --     10/13/24 002 -- 50 -- 120/69 -- -- -- --               10/12/2024 H&P  History & Physical           Anika Rosas Patient Status:  Inpatient    12/3/1995 MRN [id number]   Location [facility] Attending Rufina Marlow MD   Hosp Day # 0 PCP No primary care provider on file.      Date of Admission:  10/12/2024        HPI:   Anika Rosas is a 28 year old  female, current EGA of 37w0d with an estimated date of delivery of: 2024, by Last Menstrual Period who presents due to  IOL ..     Being admitted for induction of labor.       Pt denies N/V/F/C/CP/SOB, HA, blurry vision, dizziness, RUQ pain, ctx, lof, VB.     Her current obstetrical history is significant for poor fetal growth (IUGR)      Patient Active Problem List   Diagnosis    Pregnancy (HCC)            Fetal Movement reported as good.  GBS negative.   Rh positive.     History   Obstetric History:                    OB History    Para Term  AB Living   1              SAB IAB Ectopic Multiple Live Births                         # Outcome Date GA Lbr Derick/2nd Weight Sex Type Anes PTL Lv   1 Current                           Gyne History:   Last pap smear: 9/19/2023  Negative cytology     Past Medical History:   Past Medical History       Past Medical History:    Hypothyroidism            Past Surgerical History: Hypothyroid   Past Surgical History   History reviewed. No pertinent surgical history.        Social History:   Social History           Tobacco Use    Smoking status: Never    Smokeless tobacco: Never   Substance Use Topics    Alcohol use: Not Currently         Allergies/Medications:   Allergies:   [Allergies]    [Allergies]        Allergen Reactions    Shellfish-Derived Products ANAPHYLAXIS    Methimazole [Thiamazole] OTHER (SEE COMMENTS)       Caused pancytopenia      Medications:  [Prescriptions Prior to Admission]    [Prescriptions Prior to Admission]          Medications Prior to Admission   Medication Sig Dispense Refill Last Dose/Taking    levothyroxine 100 MCG Oral Tab Take 1 tablet (100 mcg total) by mouth before breakfast.     10/12/2024 Morning    prenatal vitamin with DHA 27-0.8-228 MG Oral Cap Take 1 capsule by mouth daily.     10/11/2024    aspirin 81 MG Oral Tab EC Take 1 tablet (81 mg total) by mouth daily.     10/11/2024    ondansetron 4 MG Oral Tablet Dispersible Take 1 tablet (4 mg total) by mouth every 8 (eight) hours as needed for Nausea.     More than a month    promethazine 25 MG Oral Tab Take 1 tablet (25 mg total) by mouth every 6 (six) hours as needed for Nausea.     More than a month            Review of Systems:   As documented in HPI        Physical Exam:   Temp:  [97.3 °F (36.3 °C)-98.5 °F (36.9 °C)] 98.5 °F (36.9 °C)  Pulse:  [59] 59  Resp:  [16] 16  BP: (116-119)/(76-80) 119/80     Constitutional: alert and cooperative in No distress     Abdomen: soft,  nontender, gravid     Vaginal exam:   Dilation: 1 cm                               Effacement: thick                              Station: -2                              Consistency: medium                              Position: mid     FHT assessment:                Baseline: 120 bpm                Variability: moderate                Accels:  present                Decels: No                Tocos:  irregular                Category: 1 tracing     Neurologic: Alert and oriented  Psychiatric: Cooperative     Results:      MFM US at 35 5/7wks on 10/3/2024:  IUP cephalic presentation  Placenta posterior; 3VC;  EFW: 2126 g (4lbs 11oz) at 11%  JAISON: 6.6 cm  BPP 8/8     Recent Results         Recent Results (from the past 24 hours)   CBC With Differential With Platelet     Collection Time: 10/12/24  5:53 AM   Result Value Ref Range     WBC 9.1 4.0 - 11.0 x10(3) uL     RBC 4.26 3.80 - 5.30 x10(6)uL     HGB 11.7 (L) 12.0 - 16.0 g/dL     HCT 34.6 (L) 35.0 - 48.0 %     MCV 81.2 80.0 - 100.0 fL     MCH 27.5 26.0 - 34.0 pg     MCHC 33.8 31.0 - 37.0 g/dL     RDW-SD 40.7 35.1 - 46.3 fL     RDW 14.0 11.0 - 15.0 %     .0 150.0 - 450.0 10(3)uL     Neutrophil Absolute Prelim 5.52 1.50 - 7.70 x10 (3) uL     Neutrophil Absolute 5.52 1.50 - 7.70 x10(3) uL     Lymphocyte Absolute 2.41 1.00 - 4.00 x10(3) uL     Monocyte Absolute 0.79 0.10 - 1.00 x10(3) uL     Eosinophil Absolute 0.21 0.00 - 0.70 x10(3) uL     Basophil Absolute 0.07 0.00 - 0.20 x10(3) uL     Immature Granulocyte Absolute 0.06 0.00 - 1.00 x10(3) uL     Neutrophil % 60.9 %     Lymphocyte % 26.6 %     Monocyte % 8.7 %     Eosinophil % 2.3 %     Basophil % 0.8 %     Immature Granulocyte % 0.7 %   T Pallidum Screening Cascade     Collection Time: 10/12/24  5:53 AM   Result Value Ref Range     Treponemal Antibodies Nonreactive Nonreactive    ABORH (Blood Type)     Collection Time: 10/12/24  5:53 AM   Result Value Ref Range     ABO BLOOD TYPE A       RH BLOOD TYPE Positive     Antibody Screen     Collection Time: 10/12/24  5:53 AM   Result Value Ref  Range     Antibody Screen Negative              No results found.           Assessment/Plan:   IUP 37w0d  in / with IOL     Obstetrical history significant for poor fetal growth (IUGR).       Patient Active Problem List   Diagnosis    Pregnancy (HCC)            Treatment Plan:  EULOGIO Rosas is a 28 year old  female, current EGA of 37w0d who presents for admission due to IOL     Risks, benefits, alternatives and possible complications have been discussed in detail with the patient.   Pre-admission, admission, and post admission procedures and expectations were discussed in detail.    All questions answered; all appropriate consents will be signed at the Hospital.     IUP at 37w0d  Fetal heart tones category 1  IOL: admit, routine labs, Cook's balloon for cervical ripening followed by Pitocin, epidural if patient desires  GBS negative  Poor fetal growth (IUGR)  CPM        Alecia Reynolds MD  10/12/2024      10/12/2024 OB/GYN Progress Note  Summary: Addendum     [facility]  EXAMINATION DATE: 10/12/2024     Indication: position check  EGA: 37w0d     Findings  Number of gestations: cunningham  Presentation:  cephalic  Fetal Cardiac Activity: present, 135 bpm  Placenta: posterior  JAISON: subjectively normal  Movement: Gross and fine movement visualized     Impression  Cunningham IUP in the cephalic position     Performed and Read By: CARMELLA Reynolds MD      Cooks Balloon Placement:  Discussed risks, benefits, and alternatives of induction of labor.  Discussed methods used to induce pt into labor.  Discussed induction of labor using Cook's balloon and how it works. Pt verbalized understanding.  Pt was placed in lithotomy position.  The bed was placed up and the leg portion down and pt was placed at the edge of the separation.  Speculum was placed in the introitus.  The cervix was visualized.  The Cooks catheter was placed per protocol.  It slid in smoothly without resistance.  The uterine balloon  was filled with 80ml and the vaginal balloon was filled with 80 ml of normal saline.  Pt tolerated the procedure well.  No complication were noted.                 10/13/2024 Labor and Delivery Note    Alecia Reynolds MD   Physician  OB/Gyn     L&D Delivery Note     Signed     Date of Service: 10/13/2024  6:56 AM     Signed       Summary: Tino Hu [[id number]]       Labor Events     labor?: No   steroids?: Full Course  Rupture date/time: 10/13/2024 0220     Rupture type: SROM  Fluid color: Clear  Labor type: Induced Onset of Labor  Induction: Oxytocin, Cervical Ripening Balloon  Indications for induction: Poor Fetal Growth         Labor Event Times    Labor onset date/time: 10/13/2024 0220  Dilation complete date/time: 10/13/2024 0530  Start pushing date/time: 10/13/2024 0552          Presentation    Presentation: Vertex  Position: Occiput Anterior         Operative Delivery    Operative Vaginal Delivery: No                          Shoulder Dystocia    Shoulder Dystocia: No         Anesthesia    Method: Epidural                    [place] Delivery            Head delivery date/time: 10/13/2024 06:33:33   Delivery date/time:  10/13/24 06:33:43   Delivery type: Normal spontaneous vaginal delivery     Details:      Delivery location: delivery room  Delivery Room Temperature: 72         Delivery Providers    Delivering Clinician: Alecia Reynolds MD    Delivery personnel:  Provider Role   Janeen Mcnamara, RN Baby Nurse   Dejah Becker RN Delivery Nurse   Mimi Kumar Surgical Tech                Cord    Vessels: 3 Vessels  Complications: Nuchal  # of loops: 1  Timed cord clamping: Yes  Time in sec: 120  Cord blood disposition: to lab  Gases sent?: Yes         Resuscitation    Method: None         [place] Measurements    No data filed         Placenta    Date/time: 10/13/2024 0639  Removal: Spontaneous  Appearance: Intact  Disposition: Pathology         Apgars     Living status: Living    Apgar Scoring Key:    0 1 2     Skin color Blue or pale Acrocyanotic Completely pink     Heart rate Absent <100 bpm >100 bpm     Reflex irritability No response Grimace Cry or active withdrawal     Muscle tone Limp Some flexion Active motion     Respiratory effort Absent Weak cry; hypoventilation Good, crying                1 Minute:  5 Minute:  10 Minute:  15 Minute:  20 Minute:       Skin color: 0  1          Heart rate: 2  2          Reflex irritablity: 2  2          Muscle tone: 2  2          Respiratory effort: 2  2          Total: 8  9             Apgars assigned by: CARMELLA BRUNO RN  [place] disposition: with mother         Skin to Skin    Skin to skin initiated date/time: 10/13/2024 0633  Skin to skin with: Mother         Vaginal Count    Initial count RN: Dejah Becker RN  Initial count Tech: Mimi Kumar     Initial counts 10     0     Final counts 20     2     Final count RN: Dejah Becker RN  Final count MD: Alecia Reynolds MD         Lacerations    Episiotomy: None  Perineal lacerations: None        Labial laceration: left        Vaginal laceration?: Yes Repaired?: Yes      Cervical laceration?: No     Clitoral laceration?: No     Quantitative blood loss (mL): 154                   [facility]  part of [facility]     Vaginal Delivery Note           Anika Alison Patient Status:  Inpatient    12/3/1995 MRN [id number]   Location [facility] Attending Alecia Reynolds MD   Hosp Day # 1 PCP No primary care provider on file.      Delivery      Surgeon: Alecia Reynolds MD     Maternal Anesthesia: epidural      Infant  Date of Delivery: 10/13/2024   Time of Delivery: 6:33 AM  Delivery Type: Normal spontaneous vaginal delivery     Infant Sex  Information for the patient's :  Tino Rosas [[id number]]   female     Infant Birthweight  Information for the patient's :  Addisonpamella Tino  [[id number]]   No birth weight on file.      Presentation Vertex [1]  Position   Occiput [1] Anterior [1]     Apgars:  1 minute: 8               5 minutes: 9                        10 minutes:       Placenta:  Date/Time of Delivery: 10/13/2024  6:39 AM   Delivery: spontaneous  Placenta to Pathology: yes     Umbilical Cord:  Cord Gases Submitted: yes  Cord Blood/Tissue Collection: no  Cord Complications: single nuchal  Sponge and Needle Counts:  Verified        Episiotomy/Laceration Repair  Left vaginal wall laceration: superficial repaired with 2-0 Vicryl after administration of 1% lidocaine.  Good repair and approximation  Left labia separation laterally (externally): repaired with 3-0 Vicryl after administratio nof 1% lidocaine.  Good repair and approximation.     Delivery Complications  none     Neonatologist Present: no     Delivery Narrative: Patient pushed and delivered a live female in OA position,over intact perineum.  Upon delivery of the fetal head, the neck was checked for a nuchal cord. Nuchal cord x1 noted and loosened at the perineum.  If needed, the nose and mouth were bulb suctioned.  Infant was delivered in total.     Delayed cord clamping was performed (2 min)  Umbilical cord was doubly clamped & cut.   Infant handed to awaiting mother with nurses at bedside.   Lacerations and repair as noted above  Cervix was inspected and no cervical lacerations or trailing membranes noted.  No uterine inversion noted.  No periuretheral / sulcus lacerations.   Placenta was delivered spontaneously intact & normal in appearance with 3 vessel cord.   Cytotec 1000mcg was placed per rectum     QBL 154ml     Alecia Reynolds MD   10/13/2024                10/14/2024 OB/GYN Progress Note     OB/Gyne Postpartum Progress Note              Anika Alison Patient Status:  Inpatient    12/3/1995 MRN [id number]   Location [facility] 3SE Attending Alecia Reynolds MD   Hosp Day # 2 PCP No primary care provider on  file.         Subjective      Patient feeling well.   Pain well controlled. Lochia appropriate.   Tolerating diet. Denies N/V  Ambulating. Spontaneously voiding. Passing flatus   Breastfeeding     Objective   Vital signs in last 24 hours:  Temp:  [98 °F (36.7 °C)-98.1 °F (36.7 °C)] 98 °F (36.7 °C)  Pulse:  [49-59] 49  Resp:  [12-16] 12  BP: (109-143)/(69-78) 109/69     Input/Output:     Intake/Output Summary (Last 24 hours) at 10/14/2024 1127  Last data filed at 10/13/2024 1530      Gross per 24 hour   Intake --   Output 1700 ml   Net -1700 ml            Constitutional: comfortable  Pulm: non labored breathing  CV: regular rate  Abdomen: soft, nontender, nondistended  Uterus: fundus firm at umbilicus,   Extremities: No calf tenderness        Results:   Labs / Path / Radiology:     Recent Results         Recent Results (from the past 24 hours)   CBC With Differential With Platelet     Collection Time: 10/14/24  6:33 AM   Result Value Ref Range     WBC 11.0 4.0 - 11.0 x10(3) uL     RBC 3.80 3.80 - 5.30 x10(6)uL     HGB 10.1 (L) 12.0 - 16.0 g/dL     HCT 31.8 (L) 35.0 - 48.0 %     MCV 83.7 80.0 - 100.0 fL     MCH 26.6 26.0 - 34.0 pg     MCHC 31.8 31.0 - 37.0 g/dL     RDW-SD 43.8 35.1 - 46.3 fL     RDW 14.6 11.0 - 15.0 %     .0 150.0 - 450.0 10(3)uL     Neutrophil Absolute Prelim 7.50 1.50 - 7.70 x10 (3) uL     Neutrophil Absolute 7.50 1.50 - 7.70 x10(3) uL     Lymphocyte Absolute 2.32 1.00 - 4.00 x10(3) uL     Monocyte Absolute 0.85 0.10 - 1.00 x10(3) uL     Eosinophil Absolute 0.18 0.00 - 0.70 x10(3) uL     Basophil Absolute 0.08 0.00 - 0.20 x10(3) uL     Immature Granulocyte Absolute 0.07 0.00 - 1.00 x10(3) uL     Neutrophil % 68.3 %     Lymphocyte % 21.1 %     Monocyte % 7.7 %     Eosinophil % 1.6 %     Basophil % 0.7 %     Immature Granulocyte % 0.6 %                 Recent Labs   Lab 10/12/24  0553 10/14/24  0633   RBC 4.26 3.80   HGB 11.7* 10.1*   HCT 34.6* 31.8*   MCV 81.2 83.7   MCH 27.5 26.6   MCHC 33.8  31.8   RDW 14.0 14.6   NEPRELIM 5.52 7.50   WBC 9.1 11.0   .0 163.0                  Assessment/Plan   28 year oldyo  , s/p spontaneous vaginal, PPD# 1        Postpartum:  -Rh pos, RI / breast / girl  -Meeting milestones  -SGA infant- monitoring temp/glucose        Anupama Song DO  10/14/2024

## 2024-10-14 NOTE — ANESTHESIA POSTPROCEDURE EVALUATION
Patient: Anika Rosas    Procedure Summary       Date: 10/13/24 Room / Location:     Anesthesia Start: 0321 Anesthesia Stop: 0639    Procedure: LABOR ANALGESIA Diagnosis:     Scheduled Providers:  Anesthesiologist: Aracelis Renteria MD    Anesthesia Type: epidural ASA Status: 2            Anesthesia Type: epidural    Vitals Value Taken Time   /70 10/13/24 0732   Temp 98 °F (36.7 °C) 10/13/24 0725   Pulse 63 10/13/24 0732   Resp 18 10/13/24 0725   SpO2 100 % 10/13/24 0700       [facility] AN Post Evaluation:   Patient Evaluated in PACU  Patient Participation: complete - patient participated  Level of Consciousness: awake  Pain Management: adequate  Airway Patency:patent  Dental exam unchanged from preop  Yes    Cardiovascular Status: acceptable  Respiratory Status: acceptable  Postoperative Hydration acceptable      VITA LEWIS MD  10/13/2024 10:54 PM

## 2024-10-14 NOTE — PROGRESS NOTES
[facility]  part of [facility]    OB/Gyne Postpartum Progress Note      Anika Rosas Patient Status:  Inpatient    12/3/1995 MRN [id number]   Location [facility] 3SE Attending Alecia Reynolds MD   Hosp Day # 2 PCP No primary care provider on file.       Subjective      Patient feeling well.   Pain well controlled. Lochia appropriate.   Tolerating diet. Denies N/V  Ambulating. Spontaneously voiding. Passing flatus   Breastfeeding    Objective   Vital signs in last 24 hours:  Temp:  [98 °F (36.7 °C)-98.1 °F (36.7 °C)] 98 °F (36.7 °C)  Pulse:  [49-59] 49  Resp:  [12-16] 12  BP: (109-143)/(69-78) 109/69    Input/Output:    Intake/Output Summary (Last 24 hours) at 10/14/2024 1127  Last data filed at 10/13/2024 1530  Gross per 24 hour   Intake --   Output 1700 ml   Net -1700 ml         Constitutional: comfortable  Pulm: non labored breathing  CV: regular rate  Abdomen: soft, nontender, nondistended  Uterus: fundus firm at umbilicus,   Extremities: No calf tenderness      Results:   Labs / Path / Radiology:    Recent Results (from the past 24 hours)   CBC With Differential With Platelet    Collection Time: 10/14/24  6:33 AM   Result Value Ref Range    WBC 11.0 4.0 - 11.0 x10(3) uL    RBC 3.80 3.80 - 5.30 x10(6)uL    HGB 10.1 (L) 12.0 - 16.0 g/dL    HCT 31.8 (L) 35.0 - 48.0 %    MCV 83.7 80.0 - 100.0 fL    MCH 26.6 26.0 - 34.0 pg    MCHC 31.8 31.0 - 37.0 g/dL    RDW-SD 43.8 35.1 - 46.3 fL    RDW 14.6 11.0 - 15.0 %    .0 150.0 - 450.0 10(3)uL    Neutrophil Absolute Prelim 7.50 1.50 - 7.70 x10 (3) uL    Neutrophil Absolute 7.50 1.50 - 7.70 x10(3) uL    Lymphocyte Absolute 2.32 1.00 - 4.00 x10(3) uL    Monocyte Absolute 0.85 0.10 - 1.00 x10(3) uL    Eosinophil Absolute 0.18 0.00 - 0.70 x10(3) uL    Basophil Absolute 0.08 0.00 - 0.20 x10(3) uL    Immature Granulocyte Absolute 0.07 0.00 - 1.00 x10(3) uL    Neutrophil % 68.3 %    Lymphocyte % 21.1 %    Monocyte % 7.7 %    Eosinophil % 1.6  %    Basophil % 0.7 %    Immature Granulocyte % 0.6 %       Recent Labs   Lab 10/12/24  0553 10/14/24  0633   RBC 4.26 3.80   HGB 11.7* 10.1*   HCT 34.6* 31.8*   MCV 81.2 83.7   MCH 27.5 26.6   MCHC 33.8 31.8   RDW 14.0 14.6   NEPRELIM 5.52 7.50   WBC 9.1 11.0   .0 163.0             Assessment/Plan   28 year oldyo  , s/p spontaneous vaginal, PPD# 1      Postpartum:  -Rh pos, RI / breast / girl  -Meeting milestones  -SGA infant- monitoring temp/glucose      Anupama Song DO  10/14/2024  11:27 AM

## 2024-10-15 VITALS
TEMPERATURE: 98 F | WEIGHT: 145 LBS | BODY MASS INDEX: 25.69 KG/M2 | SYSTOLIC BLOOD PRESSURE: 115 MMHG | DIASTOLIC BLOOD PRESSURE: 68 MMHG | HEART RATE: 46 BPM | HEIGHT: 63 IN | RESPIRATION RATE: 16 BRPM | OXYGEN SATURATION: 100 %

## 2024-10-15 RX ORDER — FERROUS SULFATE 325(65) MG
325 TABLET, DELAYED RELEASE (ENTERIC COATED) ORAL
Qty: 30 TABLET | Refills: 0 | Status: SHIPPED | OUTPATIENT
Start: 2024-10-15 | End: 2024-11-14

## 2024-10-15 NOTE — PLAN OF CARE
Discharge Note  Discharge order received from MD. IV removed. Aware of follow up appointments. Discussed what to expect after discharge and when to call physician. went over discharge AVS with patient. Patient states understanding. Pt aware of pelvic rest for 6 weeks. Prescriptions were sent to pharmacy.     Electronically sent prescriptions: motrin  Printed Scripts: none      Witnessed mom sign release of custody form for infant.

## 2024-10-15 NOTE — PROGRESS NOTES
Postpartum Progress Note - Vaginal Delivery    Assessment/Plan    Postpartum Day #2 from Vaginal Delivery: Doing well    - Pregnancy complicated by IUGR  - Continue routine postpartum care.  - Discharge home today, patient aware to schedule 6 week postpartum visit    Subjective:  The patient feels well. Pain is well controlled with current medications. Vaginal bleeding is  appropriate. The patient is ambulating well. The patient is tolerating a normal diet.  The baby is well.    Objective:  Vital signs in last 24 hours:  Temp:  [97.9 °F (36.6 °C)-98.3 °F (36.8 °C)] 97.9 °F (36.6 °C)  Pulse:  [44-46] 46  Resp:  [16-18] 16  BP: (115)/(68-77) 115/68    General: Alert and oriented, no distress  Lochia: Appropriate  Uterine Fundus: Firm, nontender  DVT Evaluation: No evidence of DVT

## 2024-10-15 NOTE — DISCHARGE SUMMARY
Discharge Summary - Vaginal Delivery    Admission Date: 10/12/2024  Discharge Date: 10/15/24    Discharge Summary: Anika Rosas is a 28 year old who presented on 10/12/2024 for scheduled induction of labor for IUGR with intermittently abnormal dopplers and oligohydramnios. She progressed through labor and underwent a normal vaginal delivery. Please see the Delivery Note for complete details. Postpartum, patient did well. She was discharged home postpartum day #2 in stable condition. At the time of discharge, her pain was well controled, vaginal bleeding was appropriate, she was tolerating a regular diet, and ambulating without difficulty. She was discharged home with instructions for follow-up.

## 2024-10-15 NOTE — PAYOR COMM NOTE
CONTINUED STAY REVIEW    Payor: [employer]  Subscriber #:  [id number]  Authorization Number: [id number]    Admit date: 10/12/24  Admit time:  5:12 AM    REVIEW DOCUMENTATION:10/15/2024  Ana Roth MD   Physician  OB/Gyn     Progress Notes     Signed     Date of Service: 10/15/2024  9:55 AM     Signed         Postpartum Progress Note - Vaginal Delivery     Assessment/Plan     Postpartum Day #2 from Vaginal Delivery: Doing well     - Pregnancy complicated by IUGR  - Continue routine postpartum care.  - Discharge home today, patient aware to schedule 6 week postpartum visit     Subjective:  The patient feels well. Pain is well controlled with current medications. Vaginal bleeding is  appropriate. The patient is ambulating well. The patient is tolerating a normal diet.  The baby is well.     Objective:  Vital signs in last 24 hours:  Temp:  [97.9 °F (36.6 °C)-98.3 °F (36.8 °C)] 97.9 °F (36.6 °C)  Pulse:  [44-46] 46  Resp:  [16-18] 16  BP: (115)/(68-77) 115/68     General: Alert and oriented, no distress  Lochia: Appropriate  Uterine Fundus: Firm, nontender  DVT Evaluation: No evidence of DVT                        MEDICATIONS ADMINISTERED IN LAST 1 DAY:  docusate sodium (Colace) cap 100 mg       Date Action Dose Route User    10/15/2024 0607 Given 100 mg Oral Denise Mcdaniel RN    10/14/2024 1830 Given 100 mg Oral Martha Tubbs RN          ibuprofen (Motrin) tab 600 mg       Date Action Dose Route User    10/15/2024 0607 Given 600 mg Oral Denise Mcdaniel RN    10/14/2024 2151 Given 600 mg Oral Denise Mcdaniel RN    10/14/2024 1524 Given 600 mg Oral Martha Tubbs RN          levothyroxine (Synthroid) tab 100 mcg       Date Action Dose Route User    10/15/2024 0607 Given 100 mcg Oral Denise Mcdaniel RN          phenylephrine-min oil-roberto (Formula R) 0.25-14-74.9 % rectal ointment       Date Action Dose Route User    10/14/2024 1218 Given (none) Rectal Martha Tubbs, RN             Vitals (last day)       Date/Time Temp Pulse Resp BP SpO2 Weight O2 Device O2 Flow Rate (L/min) [facility]    10/15/24 0900 97.9 °F (36.6 °C) 46 16 115/68 -- -- None (Room air) -- JJ    10/14/24 2200 98.3 °F (36.8 °C) 44 18 115/77 -- -- -- -- MIKE    10/14/24 0900 98 °F (36.7 °C) 51 12 124/80 -- -- None (Room air) -- MA          CIWA Scores (since admission)       None

## 2024-10-15 NOTE — PROGRESS NOTES
[facility] PPD SCREENING    Reason for Referral: EPDS = 10; Question 10 = 0    Current Stressors:    History of PPD: Pt denies hx of PPD.    Financial: Pt denies financial stressors.    Other:  None reported.     Mental Health Status:    Current Symptoms: None reported   Appearance/General Behavior: No abnormalities in appearance/ behavior. Pt seen resting in bed.    General Cognitive Functioning: No impairment.    Thought Process: Linear and logical    Thought Content: Pt denies hallucinations and delusions.    Mood/Affect: Euthymic; congruent w/ mood    Orientation: alert and oriented x4   Speech: Normal rate, rhythm, and volume.    Homicidal/Suicidal Ideation/Plan: Pt denies SI/HI.     Living Arrangement:    Who Resides at Home: Pt resides with FOB.    Has other Children: No    Is Father of the Baby involved: Yes    Has Familial Support:Yes, mostly from 's side.   Has Other Support Network: Pt reports feeling distant toward immediate family members over the last year. However she feels supported by 's side of the family.     Plan:    Provide PPD Information:     Postpartum Depression Resources Given: Yes    C[employer]le [employer] Information Given: Yes    Depression During and After Pregnancy Information given: Yes   Provide [facility] Contact Information: Yes   Other Information Given: Referrals for psychiatry and therapy provided in discharge instructions.     [facility] met with pt at bedside. Pt seen resting in bed, alert and oriented x3. Pt reports no formal history of anxiety and/or depression. No history of psychiatric medications or therapy. Pt denies thoughts of harming self or baby. No history or current SI. Pt open to receiving referrals for psychiatry and counseling. [facility] to provide in DC instructions upon discharge. Pt had no questions/concerns at this time. No safety concerns observed/reported. RN notified that f/u was completed.    LETHA Neri  [id number]

## 2024-11-09 ENCOUNTER — TELEPHONE (OUTPATIENT)
Dept: OBGYN UNIT | Facility: HOSPITAL | Age: 29
End: 2024-11-09